# Patient Record
(demographics unavailable — no encounter records)

---

## 2020-10-28 NOTE — XMS
Summarization Of Episode

                           Created on:2020



Patient:ELVER CHENG

Sex:Male

:1977

External Reference #:[id number]





Demographics







                          Address                   [address] 



                                                    [place] [address]

 

                          Home Phone                [phone]

 

                          Email Address             N

 

                          Preferred Language        English

 

                          Marital Status            Not  or 

 

                          Yazidism Affiliation     NO

 

                          Race                      WH

 

                          Ethnic Group              Not  or 









Author







                          Organization              [facility]









Support







                Name            Relationship    Address         Phone

 

                UE, UNEMPLOYED  Unavailable     Unavailable     Unavailable

 

                UE              Unavailable     Unavailable     Unavailable

 

                LALO, PI      FRIEND          N/A             [phone] [place], [place] [id number] 









Re-disclosure Warning

The records that you are about to access may contain information from federally-
assisted alcohol or drug abuse programs. If such information is present, then 
the following federally mandated warning applies: This information has been 
disclosed to you from records protected by federal confidentiality rules (42 CFR
part 2). The federal rules prohibit you from making any further disclosure of 
this information unless further disclosure is expressly permitted by the written
consent of the person to whom it pertains or as otherwise permitted by 42 CFR 
part 2. A general authorization for the release of medical or other information 
is NOT sufficient for this purpose. The Federal rules restrict any use of the 
information to criminally investigate or prosecute any alcohol or drug abuse 
patient.The records that you are about to access may contain highly sensitive 
health information, the redisclosure of which is protected by Article 27-F of 
the [facility] Public Health law. If you continue you may haveaccess to 
information: Regarding HIV / AIDS; Provided by facilities licensed or operated 
by the [facility] Office of Mental Health; or Provided by the [facility]
Office for People With Developmental Disabilities. If such information is 
present, then the following [place] mandated warning applies: This 
information has been disclosed to you from confidential records which are 
protected by state law. State law prohibits you from making any further 
disclosure of this information without the specific written consent of the 
person to whom it pertains, or as otherwise permitted by law. Any unauthorized 
further disclosure in violation of state law may result in a fine or half-way 
sentence or both. A general authorization for the release of medical or other 
information is NOT sufficient authorization for further disclosure.



Insurance Providers







          Payer name Policy type Policy ID Covered   Covered party's Policy    P

jose



                    / Coverage           party ID  relationship to Lakhani    Inf

ormation



                    type                          lakhani              

 

          [employer]           [id number]            SP                  [id number]



          [employer]                                                         

 

          [employer]           [id number]            SP                  [id number]



          [employer]                                                   

 

          [employer]ICA[employer]            [id number]            SP                  [id number]

## 2020-10-28 NOTE — PDOC
Rapid Medical Evaluation


Time Seen by Provider: 10/28/20 14:55


Medical Evaluation: 


                                    Allergies











Allergy/AdvReac Type Severity Reaction Status Date / Time


 


Penicillins AdvReac  Hives Verified 10/23/19 19:04











10/28/20 15:00


CC: abscess to dorsal aspect of left foot, ivda heroin and site used for 

injection of heroin, denies FB to area


Exam: + fluctulance, surrounding skin with erythema


Plan: labs, u/s, iv clinda





**Discharge Disposition





- Diagnosis


 Foot abscess








- Referrals





- Patient Instructions





- Post Discharge Activity

## 2020-10-28 NOTE — HP
CHIEF COMPLAINT: LE Abscess





PCP: None





HISTORY OF PRESENT ILLNESS:





43 y.o. F PMHx of HIV use in the process of transitioning M to F w/ breast 

augmentation in 2019 presenting from [facility] due to multiple abscess on the 

b/l LE. Patient states he has been injecting heroin in his legs and this last 

batch caused him to break out in multiple abscess one on the L calf, L foot and 

R foot. Patient had been admitted at [facility] back in February due 

to abscess w/ MRSA infection requiring IV antibiotics through a PICC line. He 

states he tried to drain 2 of the abscess several days ago and they are now open

lesions that are very painful. He reports fever, chills and body aches but 

denies chest pain, headache, sob, N/V/D. Family Hx is significant for his 

Fathers death at 49 due to a heart condition and mother  at 53 due to 

diabetes. 





ER course was notable for:


(1) EKG


(2) LE US


(3) CBC & CMP





Recent Travel: No





PAST MEDICAL HISTORY: Heroin use





PAST SURGICAL HISTORY: L arm reconstruction w/ hardware placement in , 

Breast augmentation in 2019





Social History:


Smoking:No


Alcohol: No


Drugs: Heroin 10-15 bags a day, Last use 2 days ago





Allergies





Penicillins Adverse Reaction (Verified 10/28/20 15:06)


   Hives








HOME MEDICATIONS:


                                Home Medications











 Medication  Instructions  Recorded


 


Emtricitabine/Tenofovir [Truvada -] 1 tablet PO DAILY 10/23/19


 


Estradiol Valerate [Delestrogen] 20 mg IM WEEKLY 10/23/19


 


Spironolactone 100 mg PO BID 10/23/19


 


Divalproex [Depakote -] 500 mg PO BID 10/28/20








REVIEW OF SYSTEMS


CONSTITUTIONAL: fever, chills


Absent: diaphoresis, generalized weakness, malaise, loss of appetite, weight 

change


HEENT: 


Absent:  rhinorrhea, nasal congestion, throat pain, throat swelling, difficulty 

swallowing, mouth swelling, ear pain, eye pain, visual changes


CARDIOVASCULAR: 


Absent: chest pain, syncope, palpitations, irregular heart rate, 

lightheadedness, peripheral edema


RESPIRATORY: 


Absent: cough, shortness of breath, dyspnea with exertion, orthopnea, wheezing, 

stridor, hemoptysis


GASTROINTESTINAL:


Absent: abdominal pain, abdominal distension, nausea, vomiting, diarrhea, 

constipation, melena, hematochezia


GENITOURINARY: 


Absent: dysuria, frequency, urgency, hesitancy, hematuria, flank pain, genital 

pain


MUSCULOSKELETAL: arthralgia


Absent: myalgia, joint swelling, back pain, neck pain


SKIN: multiple abscess( 1 R foot, 1 L calf, 1 L foot) warm, erythematous and 

painful.


Absent: rash, itching, pallor


HEMATOLOGIC/IMMUNOLOGIC: 


Absent: easy bleeding, easy bruising, lymphadenopathy, frequent infections


ENDOCRINE:


Absent: unexplained weight gain, unexplained weight loss, heat intolerance, cold

intolerance


NEUROLOGIC: 


Absent: headache, focal weakness or paresthesias, dizziness, unsteady gait, 

seizure, mental status changes, bladder or bowel incontinence


PSYCHIATRIC: 


Absent: anxiety, depression, suicidal or homicidal ideation, hallucinations.








PHYSICAL EXAMINATION


                               Vital Signs - 24 hr











  10/28/20





  15:03


 


Pulse Rate 61


 


Respiratory 18





Rate 


 


Blood Pressure 113/73


 


O2 Sat by Pulse 97





Oximetry (%) 











GENERAL: Awake, alert, and fully oriented, in no acute distress.


HEAD: Normal with no signs of trauma.


EYES: Pupils equal, round and reactive to light, extraocular movements intact, 

sclera anicteric, conjunctiva clear.


EARS, NOSE, THROAT: Oropharynx clear without exudates. Moist mucous membranes.


NECK: No JVD, or masses.


LUNGS: Breath sounds equal, clear to auscultation bilaterally. No wheezes, and 

no crackles. No accessory muscle use.


HEART: Regular rate and rhythm, normal S1 and S2 without murmur, rub or gallop.


ABDOMEN: Soft, nontender, not distended, normoactive bowel sounds, no guarding, 

no rebound, no masses.


MUSCULOSKELETAL: Normal range of motion at all joints. No bony deformities or 

tenderness. No CVA tenderness.


UPPER EXTREMITIES: 2+ pulses, warm, well-perfused. No cyanosis. No clubbing. No 

peripheral edema.


LOWER EXTREMITIES: 2+ pulses, warm, well-perfused. No calf tenderness. No 

peripheral edema. 


NEUROLOGICAL:  Cranial nerves II-XII intact. Normal speech.


PSYCHIATRIC: Cooperative. Good eye contact. Appropriate mood and affect.


SKIN: Warm, dry, normal turgor. R foot medial aspect has a 2cm x 1cm abscess 

that has been drained, warm to touch, erythema surrounding. L calf has an 

abscess 1cm x 1cm w/ an open lesion 0.5cm in diameter the area is indurated w/ 

surrounding erythema. L dorsal aspect of his foot has an abscess 2cm x 3cm 

fluctuance w/ surrounding erythema, warm and very painful to touch.





                         Laboratory Results - last 24 hr











  10/28/20 10/28/20 10/28/20





  17:00 17:00 17:00


 


WBC  20.1 H  


 


RBC  4.48  


 


Hgb  13.6  


 


Hct  39.5  


 


MCV  88.0  


 


MCH  30.2  


 


MCHC  34.3  


 


RDW  14.6  


 


Plt Count  272  D  


 


MPV  8.3  D  


 


Absolute Neuts (auto)  15.1 H  


 


Total Counted  100  


 


Neutrophils %  75.4  D  


 


Neutrophils % (Manual)  66.0  


 


Band Neutrophils %  4.0  


 


Lymphocytes %  15.6  D  


 


Lymphocytes % (Manual)  21.0  


 


Monocytes %  8.8  


 


Monocytes % (Manual)  8  


 


Eosinophils %  0.0  D  


 


Eosinophils % (Manual)  0.0  


 


Basophils %  0.2  


 


Basophils % (Manual)  1.0  


 


Nucleated RBC %  0  


 


Platelet Estimate  Normal  


 


Platelet Comment  No clumping noted  


 


Polychromasia  1+  


 


Poikilocytosis  1+  


 


Lux Cells  1+  


 


PT with INR   


 


INR   


 


Sodium   129 L 


 


Potassium   3.9 


 


Chloride   96 L 


 


Carbon Dioxide   25 


 


Anion Gap   9 


 


BUN   13.9 


 


Creatinine   1.0 


 


Est GFR (CKD-EPI)AfAm   106.36 


 


Est GFR (CKD-EPI)NonAf   91.77 


 


Random Glucose   98 


 


Lactic Acid    1.0


 


Calcium   9.0 


 


Total Bilirubin   0.5 


 


AST   24 


 


ALT   29 


 


Alkaline Phosphatase   70 


 


Total Protein   8.0 


 


Albumin   3.0 L 














  10/28/20





  17:00


 


WBC 


 


RBC 


 


Hgb 


 


Hct 


 


MCV 


 


MCH 


 


MCHC 


 


RDW 


 


Plt Count 


 


MPV 


 


Absolute Neuts (auto) 


 


Total Counted 


 


Neutrophils % 


 


Neutrophils % (Manual) 


 


Band Neutrophils % 


 


Lymphocytes % 


 


Lymphocytes % (Manual) 


 


Monocytes % 


 


Monocytes % (Manual) 


 


Eosinophils % 


 


Eosinophils % (Manual) 


 


Basophils % 


 


Basophils % (Manual) 


 


Nucleated RBC % 


 


Platelet Estimate 


 


Platelet Comment 


 


Polychromasia 


 


Poikilocytosis 


 


Lux Cells 


 


PT with INR  14.30 H


 


INR  1.17 H


 


Sodium 


 


Potassium 


 


Chloride 


 


Carbon Dioxide 


 


Anion Gap 


 


BUN 


 


Creatinine 


 


Est GFR (CKD-EPI)AfAm 


 


Est GFR (CKD-EPI)NonAf 


 


Random Glucose 


 


Lactic Acid 


 


Calcium 


 


Total Bilirubin 


 


AST 


 


ALT 


 


Alkaline Phosphatase 


 


Total Protein 


 


Albumin 











ASSESSMENT/PLAN:


43 y.o. F PMHx of HIV use in the process of transitioning M to F w/ breast 

augmentation in 2019 presenting from [facility] due to multiple abscess on the 

b/l LE.





# Cellulitis w/ abscess


- WBC 20.1 likely secondary to IVDU


- Vancomycin 1.5g BID


- Aztreonam 2g BID


- b/l LE MRI r/o osteo and quantify abscess size


- Consult wound care: Dr. Hope


- Consult ID: Dr. Burleson


- Consult Surgery: Boom


- EKG NSR, rate 66, QTc 448ms





# Hyponatremia


- Na 129, secondary to depakote & estrogen replacement therapy


- UA


- Urine osmolarity


- Urine sodium


- Lipids


- Hold IVF, reduce free water intake





# Heroin withdrawal


- Clonidine 0.1mg BID


- Addiction medicine: Dr. De La Cruz


- HIV test


- Thiamine, Folic acid


- Fall precautions





# r/o covid


- Covid PCR ordered


- Isolation precautions: Pending test result





# FEN


- Fluids: Hold IVF reduce water intake


- Diet: NPO at midnight


- Cont. to monitor and replete as appropriate





# DVT ppx


- Lovenox 40 sq Daily





# Disposition


- Admit to med-surg








Family Medical History


Family History: As Documented





Visit type





- Emergency Visit


Emergency Visit: Yes


ED Registration Date: 10/28/20


Care time: The patient presented to the Emergency Department on the above date 

and was hospitalized for further evaluation of their emergent condition.





- New Patient


This patient is new to me today: Yes


Date on this admission: 10/29/20





- Critical Care


Critical Care patient: No





ATTENDING PHYSICIAN STATEMENT





I saw and evaluated the patient.


I reviewed the resident's note and discussed the case with the resident.


I agree with the resident's findings and plan as documented.








SUBJECTIVE:








OBJECTIVE:








ASSESSMENT AND PLAN:

## 2020-10-28 NOTE — XMS
Summarization Of Episode

                           Created on:2020



Patient:ELVER CHENG

Sex:Male

:1977

External Reference #:[id number]





Demographics







                          Address                   [address] 



                                                    [place] [address]

 

                          Home Phone                [phone]

 

                          Email Address             N

 

                          Preferred Language        English

 

                          Marital Status            Not  or 

 

                          Holiness Affiliation     NO

 

                          Race                      WH

 

                          Ethnic Group              Not  or 









Author







                          Organization              [facility]









Support







                Name            Relationship    Address         Phone

 

                UE              Unavailable     Unavailable     Unavailable

 

                LALO          FRIEND          N/A             [phone] [place], [place] [id number] 









Re-disclosure Warning

The records that you are about to access may contain information from federally-
assisted alcohol or drug abuse programs. If such information is present, then 
the following federally mandated warning applies: This information has been 
disclosed to you from records protected by federal confidentiality rules (42 CFR
part 2). The federal rules prohibit you from making any further disclosure of 
this information unless further disclosure is expressly permitted by the written
consent of the person to whom it pertains or as otherwise permitted by 42 CFR 
part 2. A general authorization for the release of medical or other information 
is NOT sufficient for this purpose. The Federal rules restrict any use of the 
information to criminally investigate or prosecute any alcohol or drug abuse 
patient.The records that you are about to access may contain highly sensitive 
health information, the redisclosure of which is protected by Article 27-F of 
the [facility] Public Health law. If you continue you may haveaccess to 
information: Regarding HIV / AIDS; Provided by facilities licensed or operated 
by the [facility] Office of Mental Health; or Provided by the [facility]
Office for People With Developmental Disabilities. If such information is 
present, then the following [place] mandated warning applies: This 
information has been disclosed to you from confidential records which are 
protected by state law. State law prohibits you from making any further 
disclosure of this information without the specific written consent of the 
person to whom it pertains, or as otherwise permitted by law. Any unauthorized 
further disclosure in violation of state law may result in a fine or long term 
sentence or both. A general authorization for the release of medical or other 
information is NOT sufficient authorization for further disclosure.



Insurance Providers







          Payer name Policy type Policy ID Covered   Covered party's Policy    P

jose



                    / Coverage           party ID  relationship to Lakhani    Inf

ormation



                    type                          lakhani              

 

          [facility]           [id number]            SP                  [id number]



          [employer]                                                   

 

          [employer]           [id number]            SP                  [id number]



          [employer]                                                         

 

          [employer]            [id number]            SP                  [id number]

## 2020-10-28 NOTE — PN
Teaching Attending Note


Name of Resident: Shekhar Loco





ATTENDING PHYSICIAN STATEMENT





I saw and evaluated the patient.


I reviewed the resident's note and discussed the case with the resident.


I agree with the resident's findings and plan as documented.








SUBJECTIVE:


Patient is a 43 year old woman with a PMH of Penicillin allergy, Heroin use (

IVDA&Skin popping), PTSD, Transgender without gender reassignment, Klinefelter 

syndrome, Depression, Hepatitis C disease, Pilonidal cyst and ?NIDDM who was 

sent from [facility] to the ER today with multiple abscesses to her feet and 

legs. She states she usually shoots up in the legs and feet and wants to detox 

from heroin. She states that the areas are very painful, denies fevers, chills, 

nausea, vomiting, shortness of breath or difficulty breathing. Patient denies 

chest pain, abdominal pain, headache, palpitations, dizziness, diarrhea, 

constipation, dysuria, frequency, urgency, melena, hematochezia or hematuria. No

sick contacts or recent travels. Family history - father  of heart disease 

at age 49 and mother  of complications of DM at age 53.  





OBJECTIVE:


Alert


                                   Vital Signs











 Period  Temp  Pulse  Resp  BP Sys/Egan  Pulse Ox


 


 Last 24 Hr    61  18  113/73  97








HEENT: No Jaundice, eye redness or discharge, PERRLA, EOMI. Normocephalic, 

atraumatic. External ears are normal and hearing is grossly intact. No nasal 

discharge.


Neck: Supple, nontender. No palpable adenopathy or thyromegaly. No JVD


Chest: Good effort. Clear to auscultation and percussion.


Heart: Regular. No S3, rub or murmur


Abdomen: Not distended, soft, nontender and no HSM. No rebound or guarding. 

Normal bowel sounds.


Ext: Peripheral pulses intact. No leg edema. Left foot abscess with surrounding 

erythema. Right lateral malleolus with weeping abscess and surrounding 

cellulitis. Left posterior knee abscess. 


Skin: Warm and dry. No petechiae, rash or ecchymosis. Sequelae of IVDA.


Neuro: Alert. Oriented x3. CN 2-12 grossly intact. Sensation grossly intact in 

all four extremities and DTR are symmetric.


Psych: Appropriate mood and affect. Good insight.





                                Home Medications











 Medication  Instructions  Recorded


 


Emtricitabine/Tenofovir [Truvada -] 1 tablet PO DAILY 10/23/19


 


Estradiol Valerate [Delestrogen] 20 mg IM WEEKLY 10/23/19


 


Spironolactone 100 mg PO BID 10/23/19


 


Divalproex [Depakote -] 500 mg PO BID 10/28/20








                              Abnormal Lab Results











  10/28/20 10/28/20 10/28/20





  17:00 17:00 17:00


 


WBC  20.1 H  


 


Absolute Neuts (auto)  15.1 H  


 


PT with INR    14.30 H


 


INR    1.17 H


 


Sodium   129 L 


 


Chloride   96 L 


 


Albumin   3.0 L 








                               Current Medications











Generic Name Dose Route Start Last Admin





  Trade Name Freq  PRN Reason Stop Dose Admin


 


Acetaminophen  650 mg  10/28/20 22:17 





  Tylenol -  PO  





  Q6H PRN  





  Fever Or Pain 7-10  


 


Clonidine  0.1 mg  10/29/20 10:00 





  Catapres -  PO  





  BID LUZ  


 


Enoxaparin Sodium  40 mg  10/29/20 10:00 





  Lovenox -  SQ  





  DAILY LUZ  


 


Folic Acid  1 mg  10/29/20 10:00 





  Folic Acid -  PO  





  DAILY LUZ  


 


Vancomycin HCl 1,500 mg/  250 mls @ 125 mls/hr  10/29/20 11:00 





  Dextrose  IVPB  





  Q12H LUZ  





  Protocol  


 


Aztreonam 2 gm/ Dextrose  100 mls @ 100 mls/hr  10/29/20 11:00 





  IVPB  





  Q12H LUZ  





  Protocol  


 


Multivitamins/Minerals/Vitamin C  1 tab  10/29/20 10:00 





  Tab-A-Vit -  PO  





  DAILY LUZ  


 


Thiamine HCl  100 mg  10/29/20 10:00 





  Vitamin B1 -  PO  





  DAILY [facility]  











ASSESSMENT AND PLAN:


1. Lower extremity cellulitis and abscesses  - No acute abnormality on CXR. Left

foot sonogram shows abscess on the dorsum of the foot. Sepsis workup done. Being

treated with IV Aztreonam and IV Vancomycin and IV NS. Will get MRI of lower 

extremities to rule out osteomyelitis and quantify abscesses. Consult Surgery, 

Podiatry, Wound care and ID. EKG shows NSR at 66/minute and QTc 448, T wave 

inversion in V1-V3 with no significant acute ischemic ST changes. No old EKG 

available for comparison. Initial troponin is negative. Will avoid drugs that 

may prolong QTc. 





Hyponatremia likely partly due to estrogen injections. Will get urinalysis, 

urine osmolality, lipid profile and urine sodium. Limit free water intake. Viral

testing for COVID-19 ordered and patient placed on airborne, droplet and contact

isolation. Will continue comprehensive care for all of patients comorbid 

conditions.





2. Hypoalbuminemia - Possibly due to combined effects of malnutrition and 

inflammation associated with comorbid conditions. Will ensure adequate dietary 

protein intake and also consult dietician. Urinalysis pending.





3. Polysubstance abuse  Will monitor closely for drug withdrawal. Implement 

CIWA Librium alcohol withdrawal protocol and do neurochecks. Implement seizure, 

fall and aspiration precautions. Treat with thiamine and folic acid. Monitor and

replete electrolytes (Ca,Mg,K,P). Counseled patient about abstaining from 

illicit drugs/alcohol. Will consult Addiction specialist and refer to 

drug/alcohol detox upon discharge.





4. DVT prophylaxis - Lovenox 40 mg SQ q 24 hours.     





5. Advance directives - Full code

## 2020-10-28 NOTE — PDOC
History of Present Illness





- General


Chief Complaint: Wound


Stated Complaint: WOUND CARE


Time Seen by Provider: 10/28/20 14:55


History Source: Patient


Exam Limitations: No Limitations





Past History





- Travel History


Traveled outside of the country in the last 30 days: No


Close contact w/someone who was outside of country & ill: No





- Medical History


Allergies/Adverse Reactions: 


                                    Allergies











Allergy/AdvReac Type Severity Reaction Status Date / Time


 


Penicillins AdvReac  Hives Verified 10/28/20 15:06











Home Medications: 


Ambulatory Orders





Emtricitabine/Tenofovir [Truvada -] 1 tablet PO DAILY 10/23/19 


Estradiol Valerate [Delestrogen] 20 mg IM WEEKLY 10/23/19 


Spironolactone 100 mg PO BID 10/23/19 


Divalproex [Depakote -] 500 mg PO BID 10/28/20 








Asthma: No


Cardiac Disorders: No


COPD: No


Diabetes: Yes


GI Disorders: No


 Disorders: No


HTN: No


Kidney Stones: No


Liver Disease: Yes (HEP C)


Seizures: No





- Surgical History


Abdominal Surgery: No


Appendectomy: No


Cardiac Surgery: No


Cholecystectomy: No


Lung Surgery: No


Neurologic Surgery: No


Orthopedic Surgery: No





- Reproductive History


Testicular Surgery: No (still not reassigned surgery done.)





- Psycho-Social/Smoking History


Smoking History: Never smoked


Have you smoked in the past 12 months: Yes


Number of Cigarettes Smoked Daily: 10


'Breaking Loose' booklet given: 10/23/19





- Substance Abuse Hx (Audit-C & DAST Scrn)


In the last yr the pt used illegal drug/Rx for NonMed reason: Yes


Score:  Yes response is considered Positive: 1


Screen Result (Positive result requires Nsg. DAST-10): Positive





**Review of Systems





- Review of Systems


Able to Perform ROS?: Yes


Comments:: 





10/28/20 16:52


CONSTITUTIONAL: 


Absent: fever, chills, diaphoresis, generalized weakness, malaise, loss of 

appetite


HEENT: 


Absent: rhinorrhea, nasal congestion, throat pain, throat swelling, difficulty 

swallowing, mouth swelling, ear pain, eye pain, visual Changes


CARDIOVASCULAR: 


Absent: chest pain, loss of consciousness, palpitations, irregular heart rate, 

peripheral edema


RESPIRATORY: 


Absent: cough, shortness of breath, dyspnea with exertion, orthopnea, wheezing, 

stridor, hemoptysis


GASTROINTESTINAL:


Absent: abdominal pain, abdominal distension, nausea, vomiting, diarrhea, 

constipation, melena, hematochezia


GENITOURINARY: 


Absent: dysuria, frequency, urgency, hesitancy, hematuria, flank pain, genital 

pain


MUSCULOSKELETAL: 


Absent: myalgia, arthralgia, joint swelling


SKIN: 


Present: foot wounds and cellulitis Absent: rash, itching, pallor


HEMATOLOGIC/IMMUNOLOGIC: 


Absent: easy bleeding, easy bruising, lymphadenopathy, frequent infections


ENDOCRINE:


Absent: unexplained weight gain, unexplained weight loss, heat intolerance, cold

intolerance


NEUROLOGIC: 


Absent: headache, focal weakness or paresthesias, dizziness, unsteady gait, 

seizure, mental status changes, bladder or bowel incontinence


PSYCHIATRIC: 


Absent: anxiety, depression, suicidal or homicidal ideation, hallucinations.


Is the patient limited English proficient: No





*Physical Exam





- Vital Signs


                                Last Vital Signs











Temp Pulse Resp BP Pulse Ox


 


    61   18   113/73   97 


 


    10/28/20 15:03  10/28/20 15:03  10/28/20 15:03  10/28/20 15:03














- Physical Exam





10/28/20 17:03


GENERAL:


Well developed, well nourished. Awake and alert. No acute distress.


HEENT:


Normocephalic, atraumatic. PERRLA, EOMI. No conjunctival pallor. Sclera are non-

icteric. Moist mucous membranes. 


NECK: 


Supple. Full ROM. No lymphadenopathy.


CARDIOVASCULAR:


Regular rate and rhythm. No murmurs, rubs, or gallops. Distal pulses are 2+ and 

symmetric. 


PULMONARY: 


No evidence of respiratory distress. Lungs clear to auscultation bilaterally. No

wheezing, rales or rhonchi.


ABDOMINAL:


Soft. Non-tender. Non-distended. No rebound or guarding. No organomegaly. 

Normoactive bowel sounds. 


MUSCULOSKELETAL 


Normal range of motion at all joints. No bony deformities or tenderness. No CVA 

tenderness.


EXTREMITIES: 


No cyanosis. No clubbing. No edema. No calf tenderness.


SKIN: 


Patient with multiple abscesses.  Left foot ventral aspect with fluctuance as 

well as surrounding erythema.  Right lateral malleolus with weeping abscess and 

surrounding cellulitis.  Open draining abscess to the left posterior knee. Warm 

and dry. Normal capillary refill. No jaundice. 


NEUROLOGICAL: 


Alert, awake, appropriate. Cranial nerves 2-12 intact. No deficits to light 

touch and temperature in face, upper extremities and lower extremities. No motor

deficits in the in face, upper extremities and lower extremities. Normoreflexic 

in the upper and lower extremities. Normal speech. Toes are down-going 

bilaterally. Gait is normal without ataxia.


PSYCHIATRIC: 


Cooperative. Good eye contact. Appropriate mood and affect.





ED Treatment Course





- LABORATORY


CBC & Chemistry Diagram: 


                                 10/28/20 17:00





                                 10/28/20 17:00





Medical Decision Making





- Medical Decision Making





10/28/20 17:07


The patient is a 43-year-old male past medical history of IV drug use, heroin l

ast used 2 days ago, presents to the ER today with multiple abscesses to his 

feet and legs.  He states he usually shoots up in his legs and feet.  He states 

that he wants to detox from heroin.  He states that the areas are very painful 

denies fevers, chills, nausea, vomiting, shortness of breath or difficulty 

breathing.





A/P: Cellulitis and IV drug user


On exam patient has multiple abscesses see exam findings.


Basic labs, ultrasound ordered from [facility].


Additional blood cultures, lab work also ordered as this will most likely be an 

admission.


Signout given to TAQUERIA Palma pending lab work and ultrasound findings.





Discharge





- Discharge Information


Problems reviewed: Yes


Clinical Impression/Diagnosis: 


 Foot abscess





Cellulitis


Qualifiers:


 Site of cellulitis: extremity Site of cellulitis of extremity: lower extremity 

Laterality: unspecified laterality Qualified Code(s): L03.119 - Cellulitis of 

unspecified part of limb





Condition: Fair





- Follow up/Referral





- Patient Discharge Instructions





- Post Discharge Activity

## 2020-10-28 NOTE — PDOC
*Physical Exam





- Vital Signs


                                Last Vital Signs











Temp Pulse Resp BP Pulse Ox


 


    61   18   113/73   97 


 


    10/28/20 15:03  10/28/20 15:03  10/28/20 15:03  10/28/20 15:03














ED Treatment Course





- LABORATORY


CBC & Chemistry Diagram: 


                                 10/28/20 17:00





                                 10/28/20 17:00





- ADDITIONAL ORDERS


Additional order review: 


                               Laboratory  Results











  10/28/20 10/28/20 10/28/20





  17:00 17:00 17:00


 


PT with INR  14.30 H  


 


INR  1.17 H  


 


Sodium    129 L


 


Potassium    3.9


 


Chloride    96 L


 


Carbon Dioxide    25


 


Anion Gap    9


 


BUN    13.9


 


Creatinine    1.0


 


Est GFR (CKD-EPI)AfAm    106.36


 


Est GFR (CKD-EPI)NonAf    91.77


 


Random Glucose    98


 


Lactic Acid   1.0 


 


Calcium    9.0


 


Total Bilirubin    0.5


 


AST    24


 


ALT    29


 


Alkaline Phosphatase    70


 


Total Protein    8.0


 


Albumin    3.0 L








                                        











  10/28/20





  17:00


 


RBC  4.48


 


MCV  88.0


 


MCHC  34.3


 


RDW  14.6


 


MPV  8.3  D


 


Neutrophils %  75.4  D


 


Lymphocytes %  15.6  D


 


Monocytes %  8.8


 


Eosinophils %  0.0  D


 


Basophils %  0.2














ED Progress Note





- Progress Note


Progress Note: 





10/28/20 18:27


Received patient from OLENA Echeverria.





Briefly this is a 43-year-old man with history of IV drug use who presents 

emergency department with cellulitis and abscesses.  Multiple areas of 

cellulitis present notable to the dorsum of the left foot and cellulitis in 

bilateral popliteal area.





Labs notable for leukocytosis 20,000.


DVT shows abscess the dorsum of the left foot





Clindamycin given





Will admit once all testing is resulted.





Medical Decision Making





- Medical Decision Making





10/28/20 18:36


                                Laboratory Tests











  10/28/20 10/28/20 10/28/20





  17:00 17:00 17:00


 


WBC  20.1 H  


 


RBC  4.48  


 


Hgb  13.6  


 


Hct  39.5  


 


MCV  88.0  


 


MCH  30.2  


 


MCHC  34.3  


 


RDW  14.6  


 


Plt Count  272  D  


 


MPV  8.3  D  


 


Absolute Neuts (auto)  15.1 H  


 


Neutrophils %  75.4  D  


 


Neutrophils % (Manual)  Pending  


 


Lymphocytes %  15.6  D  


 


Monocytes %  8.8  


 


Eosinophils %  0.0  D  


 


Basophils %  0.2  


 


Nucleated RBC %  0  


 


PT with INR   


 


INR   


 


Sodium   129 L 


 


Potassium   3.9 


 


Chloride   96 L 


 


Carbon Dioxide   25 


 


Anion Gap   9 


 


BUN   13.9 


 


Creatinine   1.0 


 


Est GFR (CKD-EPI)AfAm   106.36 


 


Est GFR (CKD-EPI)NonAf   91.77 


 


Random Glucose   98 


 


Lactic Acid    1.0


 


Calcium   9.0 


 


Total Bilirubin   0.5 


 


AST   24 


 


ALT   29 


 


Alkaline Phosphatase   70 


 


Total Protein   8.0 


 


Albumin   3.0 L 














  10/28/20





  17:00


 


WBC 


 


RBC 


 


Hgb 


 


Hct 


 


MCV 


 


MCH 


 


MCHC 


 


RDW 


 


Plt Count 


 


MPV 


 


Absolute Neuts (auto) 


 


Neutrophils % 


 


Neutrophils % (Manual) 


 


Lymphocytes % 


 


Monocytes % 


 


Eosinophils % 


 


Basophils % 


 


Nucleated RBC % 


 


PT with INR  14.30 H


 


INR  1.17 H


 


Sodium 


 


Potassium 


 


Chloride 


 


Carbon Dioxide 


 


Anion Gap 


 


BUN 


 


Creatinine 


 


Est GFR (CKD-EPI)AfAm 


 


Est GFR (CKD-EPI)NonAf 


 


Random Glucose 


 


Lactic Acid 


 


Calcium 


 


Total Bilirubin 


 


AST 


 


ALT 


 


Alkaline Phosphatase 


 


Total Protein 


 


Albumin 








Ultrasound as read by Dr. Landin: Approximate 2.6 x 2.3 x 1.1 cm complex fluid 

collection with intraluminal debris is seen along the dorsum of the left foot 

with associated increased vascularity on Doppler imaging suggestive of an 

abscess.





Chest x-ray as read by me: Angle sharp.  Cardiac silhouette is within normal 

limits.  No focal infiltrates or consolidations are noted.





Covid pending





Will contact hospitalist for admission.


10/28/20 18:50


Case has been discussed with Dr. Patten who accepts patient for inpatient 

admission.  Antibiotics changed to aztreonam and vancomycin given patient's 

penicillin allergy.  Clindamycin stopped.





Discharge





- Discharge Information


Problems reviewed: Yes


Clinical Impression/Diagnosis: 


 Foot abscess, Cellulitis





Condition: Fair





- Admission


Yes





- Follow up/Referral





- Patient Discharge Instructions





- Post Discharge Activity

## 2020-10-29 NOTE — CON.ID
Consult


Consult Specialty:: infectious diseases


Referred by:: Hospitalist


Reason for Consultation:: abscess on the foot due to injecting cocaine





- History of Present Illness


Chief Complaint: abscess on b/l feet


History of Present Illness: 





43 y.o. F PMHx of hcv  w/ breast augmentation in 2019 presenting from [facility] 

due to multiple abscess on the b/l LE. Patient states he has been injecting 

heroin in his legs and this last batch caused her to break out in multiple 

abscess one on the L calf, L foot and R foot. Patient had been admitted at 

[facility] back in February due to abscess w/ MRSA infection 

requiring IV antibiotics through a PICC line. she states he tried to drain 2 of 

the abscess several days ago and they are now open lesions that are very 

painful. she reports fever, chills and body aches but denies chest pain, 

headache, sob, N/V/D. Family Hx is significant for his Fathers death at 49 due 

to a heart condition and mother  at 53 due to diabetes. 


patient mentions that the abscess on the rt leg ahs  drained








- History Source


History Provided By: Patient


Limitations to Obtaining History: No Limitations





- Alcohol/Substance Use


Hx Alcohol Use: Yes





- Smoking History


Smoking history: Never smoked


Have you smoked in the past 12 months: Yes


Aproximately how many cigarettes per day: 10





Home Medications





- Allergies


Allergies/Adverse Reactions: 


                                    Allergies











Allergy/AdvReac Type Severity Reaction Status Date / Time


 


Penicillins AdvReac  Hives Verified 10/28/20 15:06














- Home Medications


Home Medications: 


Ambulatory Orders





Emtricitabine/Tenofovir [Truvada -] 1 tablet PO DAILY 10/23/19 


Estradiol Valerate [Delestrogen] 20 mg IM WEEKLY 10/23/19 


RX: Spironolactone 100 mg PO BID 10/23/19 


Divalproex [Depakote -] 500 mg PO BID 10/28/20 











Review of Systems





- Review of Systems


Constitutional: reports: No Symptoms


Eyes: reports: No Symptoms


HENT: reports: No Symptoms


Neck: reports: No Symptoms


Cardiovascular: reports: No Symptoms


Respiratory: reports: No Symptoms


Gastrointestinal: reports: No Symptoms


Integumentary: reports: Wound, Other (abscess on the foot)


Neurological: reports: No Symptoms


Hematology/Lymphatic: reports: No Symptoms


Psychiatric: reports: No Symptoms





Physical Exam


Vital Signs: 


                                   Vital Signs











Temperature  98.5 F   10/29/20 06:00


 


Pulse Rate  56 L  10/29/20 06:00


 


Respiratory Rate  20   10/29/20 06:00


 


Blood Pressure  119/69   10/29/20 06:00


 


O2 Sat by Pulse Oximetry (%)  98   10/29/20 06:00











Constitutional: Yes: Well Nourished, Calm


Cardiovascular: Yes: Regular Rate and Rhythm


Respiratory: Yes: Regular, CTA Bilaterally


Gastrointestinal: Yes: Normal Bowel Sounds, Soft


Musculoskeletal: Yes: WNL


Extremities: Yes: Other


Wound/Incision: Yes: Other (multiple abscess)


Neurological: Yes: Alert, Oriented


Psychiatric: Yes: Alert, Oriented


Labs: 


                                    CBC, BMP





                                 10/28/20 17:00 





                                 10/28/20 17:00 











Imaging





- Results


Chest X-ray: Report Reviewed, Image Reviewed


Ultrasound: Report Reviewed, Image Reviewed





Assessment/Plan





this patient who is a  drug abuser injecting at multiple places--cocaine 

--injected in the legs coming with abscess--surgery ahs  seen the patient and 

plan is for drainage


will start him on broad  spectrum abx


surgery


await for cx report

## 2020-10-29 NOTE — PN
Physical Exam: 


SUBJECTIVE: Patient seen and examined.  





Patient goes by the name Geraldine.  She tells me that she is transgender: male to

female and is seeking gender transformation at [place] next year.  She 

injects heroin and often uses her foot and developed an abscess of her anterior 

left foot.  Prior to this admission, she developed an abscess on her left calf 

and right ankle and lanced it herself with a syringe.  Admits to using heroin 

since age of 21. 





She wants to stop using heroin and now on a methadone taper with [facility]. 





OBJECTIVE:


years of injecting heroin  use-since age 21, will send for an echo


blood cultures pending and ID following


----


Patient is a 43 year old transgender female with history of heroin use since the

age of 21. Patient is a male by birth but identifies as a female and will be 

addressed as a female  as per her wishes.  She presents from [facility] with 

multiple abscess on bilateral lower ext.  She tells me she self lanced the one 

of her right ankle and left calf with a syringe.  She now has a large painful 

abscess on the anterior portion of the left foot and will be going to the OR 

today.  Discussed with her the dangers of lancing her own wounds as it can cause

a worse infection such as a blood infection or osteomyelitis.











 Period  Temp  Pulse  Resp  BP Sys/Egan  Pulse Ox


 


 Last 24 Hr  98.2 F-98.5 F  56-64  18-20  113-119/68-73  97-98








GENERAL: The patient is awake, alert, and fully oriented, in no acute distress.


HEAD: Normal with no signs of trauma.  multiple facial tattoos


EYES: PERRL, extraocular movements intact, sclera anicteric, conjunctiva clear. 

No ptosis. 


ENT: Ears normal, nares patent, oropharynx clear without exudates, moist mucous 

membranes.


NECK: Trachea midline, full range of motion, supple. 


LUNGS: Breath sounds equal, clear to auscultation bilaterally, no wheezes 


HEART: Regular rate and rhythm 


ABDOMEN: Soft, nontender, nondistended, normoactive bowel  


EXTREMITIES: right medial wound, left calf wound, left anterior foot with large 

abscess


NEUROLOGICAL: normal speech, gait not observed.


PSYCH: Normal mood, normal affect.


Laboratory Results - last 24 hr











  10/28/20 10/28/20 10/28/20





  06:10 17:00 17:00


 


WBC   20.1 H 


 


RBC   4.48 


 


Hgb   13.6 


 


Hct   39.5 


 


MCV   88.0 


 


MCH   30.2 


 


MCHC   34.3 


 


RDW   14.6 


 


Plt Count   272  D 


 


MPV   8.3  D 


 


Absolute Neuts (auto)   15.1 H 


 


Total Counted   100 


 


Neutrophils %   75.4  D 


 


Neutrophils % (Manual)   66.0 


 


Band Neutrophils %   4.0 


 


Lymphocytes %   15.6  D 


 


Lymphocytes % (Manual)   21.0 


 


Monocytes %   8.8 


 


Monocytes % (Manual)   8 


 


Eosinophils %   0.0  D 


 


Eosinophils % (Manual)   0.0 


 


Basophils %   0.2 


 


Basophils % (Manual)   1.0 


 


Nucleated RBC %   0 


 


Platelet Estimate   Normal 


 


Platelet Comment   No clumping noted 


 


Polychromasia   1+ 


 


Poikilocytosis   1+ 


 


Lux Cells   1+ 


 


PT with INR   


 


INR   


 


Sodium    129 L


 


Potassium    3.9


 


Chloride    96 L


 


Carbon Dioxide    25


 


Anion Gap    9


 


BUN    13.9


 


Creatinine    1.0


 


Est GFR (CKD-EPI)AfAm    106.36


 


Est GFR (CKD-EPI)NonAf    91.77


 


Random Glucose    98


 


Lactic Acid   


 


Calcium    9.0


 


Total Bilirubin    0.5


 


AST    24


 


ALT    29


 


Alkaline Phosphatase    70


 


Total Protein    8.0


 


Albumin    3.0 L


 


Urine Color  Yellow  


 


Urine Appearance  Clear  


 


Urine pH  7.0  D  


 


Ur Specific Gravity  1.009 L  


 


Urine Protein  Negative  


 


Urine Glucose (UA)  Negative  


 


Urine Ketones  Negative  


 


Urine Blood  Negative  


 


Urine Nitrite  Negative  


 


Urine Bilirubin  Negative  


 


Urine Urobilinogen  2.0  


 


Ur Leukocyte Esterase  Negative  


 


Urine Osmolality   


 


Ur Random Sodium   














  10/28/20 10/28/20 10/29/20





  17:00 17:00 06:10


 


WBC   


 


RBC   


 


Hgb   


 


Hct   


 


MCV   


 


MCH   


 


MCHC   


 


RDW   


 


Plt Count   


 


MPV   


 


Absolute Neuts (auto)   


 


Total Counted   


 


Neutrophils %   


 


Neutrophils % (Manual)   


 


Band Neutrophils %   


 


Lymphocytes %   


 


Lymphocytes % (Manual)   


 


Monocytes %   


 


Monocytes % (Manual)   


 


Eosinophils %   


 


Eosinophils % (Manual)   


 


Basophils %   


 


Basophils % (Manual)   


 


Nucleated RBC %   


 


Platelet Estimate   


 


Platelet Comment   


 


Polychromasia   


 


Poikilocytosis   


 


Lux Cells   


 


PT with INR   14.30 H 


 


INR   1.17 H 


 


Sodium   


 


Potassium   


 


Chloride   


 


Carbon Dioxide   


 


Anion Gap   


 


BUN   


 


Creatinine   


 


Est GFR (CKD-EPI)AfAm   


 


Est GFR (CKD-EPI)NonAf   


 


Random Glucose   


 


Lactic Acid  1.0  


 


Calcium   


 


Total Bilirubin   


 


AST   


 


ALT   


 


Alkaline Phosphatase   


 


Total Protein   


 


Albumin   


 


Urine Color   


 


Urine Appearance   


 


Urine pH   


 


Ur Specific Gravity   


 


Urine Protein   


 


Urine Glucose (UA)   


 


Urine Ketones   


 


Urine Blood   


 


Urine Nitrite   


 


Urine Bilirubin   


 


Urine Urobilinogen   


 


Ur Leukocyte Esterase   


 


Urine Osmolality   


 


Ur Random Sodium    < 18 L














  10/29/20





  06:10


 


WBC 


 


RBC 


 


Hgb 


 


Hct 


 


MCV 


 


MCH 


 


MCHC 


 


RDW 


 


Plt Count 


 


MPV 


 


Absolute Neuts (auto) 


 


Total Counted 


 


Neutrophils % 


 


Neutrophils % (Manual) 


 


Band Neutrophils % 


 


Lymphocytes % 


 


Lymphocytes % (Manual) 


 


Monocytes % 


 


Monocytes % (Manual) 


 


Eosinophils % 


 


Eosinophils % (Manual) 


 


Basophils % 


 


Basophils % (Manual) 


 


Nucleated RBC % 


 


Platelet Estimate 


 


Platelet Comment 


 


Polychromasia 


 


Poikilocytosis 


 


[place] Cells 


 


PT with INR 


 


INR 


 


Sodium 


 


Potassium 


 


Chloride 


 


Carbon Dioxide 


 


Anion Gap 


 


BUN 


 


Creatinine 


 


Est GFR (CKD-EPI)AfAm 


 


Est GFR (CKD-EPI)NonAf 


 


Random Glucose 


 


Lactic Acid 


 


Calcium 


 


Total Bilirubin 


 


AST 


 


ALT 


 


Alkaline Phosphatase 


 


Total Protein 


 


Albumin 


 


Urine Color 


 


Urine Appearance 


 


Urine pH 


 


Ur Specific Gravity 


 


Urine Protein 


 


Urine Glucose (UA) 


 


Urine Ketones 


 


Urine Blood 


 


Urine Nitrite 


 


Urine Bilirubin 


 


Urine Urobilinogen 


 


Ur Leukocyte Esterase 


 


Urine Osmolality  251 L


 


Ur Random Sodium 








Active Medications











Generic Name Dose Route Start Last Admin





  Trade Name Freq  PRN Reason Stop Dose Admin


 


Acetaminophen  650 mg  10/28/20 22:17  10/28/20 22:35





  Tylenol -  PO   650 mg





  Q6H PRN   Administration





  Fever Or Pain 7-10  


 


Clonidine  0.1 mg  10/29/20 09:41 





  Catapres -  PO  10/31/20 23:59 





  Q4H PRN  





  Withdrawal Symptoms  


 


Folic Acid  1 mg  10/29/20 10:00  10/29/20 10:38





  Folic Acid -  PO   1 mg





  DAILY LUZ   Administration


 


Vancomycin HCl 1,500 mg/  500 mls @ 250 mls/hr  10/29/20 03:00  10/29/20 04:50





  Dextrose  IVPB  10/29/20 16:59  Not Given





  Q12H LUZ  





  Protocol  


 


Aztreonam 2 gm/ Dextrose  100 mls @ 100 mls/hr  10/29/20 03:00  10/29/20 04:49





  IVPB  10/29/20 15:59  Not Given





  Q12H LUZ  





  Protocol  


 


Aztreonam 2 gm/ Dextrose  100 mls @ 100 mls/hr  10/30/20 03:15 





  IVPB  





  Q12H LUZ  





  Protocol  


 


Vancomycin HCl 1,500 mg/  500 mls @ 250 mls/hr  10/30/20 03:15 





  Dextrose  IVPB  





  Q12H LUZ  





  Protocol  


 


Vancomycin HCl 1,500 mg/  500 mls @ 250 mls/hr  10/29/20 11:45 





  Dextrose  IVPB  





  Q24H LUZ  





  Protocol  


 


Aztreonam 1 gm/ Dextrose  50 mls @ 100 mls/hr  10/29/20 11:45 





  IVPB  





  Q8H-IV LUZ  





  Protocol  


 


Methadone HCl  5 mg  11/03/20 06:00 





  Dolophine -  PO  11/03/20 06:01 





  ONCE@0600 ONE  


 


Methadone HCl  10 mg  11/02/20 10:00 





  Dolophine -  PO  11/02/20 10:01 





  ONCE ONE  


 


Methadone HCl  20 mg  10/31/20 10:00 





  Dolophine -  PO  10/31/20 10:01 





  ONCE ONE  


 


Methadone HCl 20 mg/ Methadone  25 mg  10/30/20 10:00 





  HCl 5 mg  PO  10/30/20 10:01 





  ONCE ONE  


 


Methadone HCl 10 mg/ Methadone  15 mg  11/01/20 10:00 





  HCl 5 mg  PO  11/01/20 10:01 





  ONCE ONE  


 


Multivitamins/Minerals/Vitamin C  1 tab  10/29/20 10:00  10/29/20 10:38





  Tab-A-Vit -  PO   1 tab





  DAILY LUZ   Administration


 


Naloxone HCl  0.4 mg  10/29/20 09:41 





  Narcan -  IM  





  PRN PRN  





  RESPIRATORY DEPRESSION  


 


Thiamine HCl  100 mg  10/29/20 10:00  10/29/20 10:38





  Vitamin B1 -  PO   100 mg





  DAILY LUZ   Administration











ASSESSMENT/PLAN:








Problem List





- Problems


(1) Sepsis


Assessment/Plan: 


elevated WBC with abscess formation of foot, no fevers


blood cultures ordered


on vanco and aztronam


monitor for fevers


ID following


Code(s): A41.9 - SEPSIS, UNSPECIFIED ORGANISM   





(2) Foot abscess


Assessment/Plan: 


for I&D today with surgery.  reports severe pain of this foot.


Code(s): L02.619 - CUTANEOUS ABSCESS OF UNSPECIFIED FOOT   





(3) Opioid use disorder


Assessment/Plan: 


on methadone taper


addition medicine following


Code(s): F11.99 - OPIOID USE, UNSP WITH UNSPECIFIED OPIOID-INDUCED DISORDER   





(4) Male-to-female transgender person


Assessment/Plan: 


supportive care


patient will follow up with [place] for gender re assignment surgery next 

year


Code(s): F64.0 - TRANSSEXUALISM   





(5) DVT prophylaxis


Assessment/Plan: 


SCDs


Code(s): Z29.9 - ENCOUNTER FOR PROPHYLACTIC MEASURES, UNSPECIFIED   





Visit type





- Emergency Visit


Emergency Visit: Yes


ED Registration Date: 10/28/20


Care time: The patient presented to the Emergency Department on the above date 

and was hospitalized for further evaluation of their emergent condition.





- New Patient


This patient is new to me today: Yes


Date on this admission: 10/29/20





- Critical Care


Critical Care patient: No





- Discharge Referral


Referred to [facility] No

## 2020-10-29 NOTE — OP
Operative Note





- Note:


Operative Date: 10/29/20


Pre-Operative Diagnosis: abscess left foot


Operation: incision/drainage right foot


Findings: 





abscess dorsum right foot


Surgeon: Raymond Nur


Anesthesiologist/CRNA: Lashonda Jorge


Anesthesia: MAC


Estimated Blood Loss (mls): 5

## 2020-10-29 NOTE — EKG
Test Reason : 

Blood Pressure : ***/*** mmHG

Vent. Rate : 066 BPM     Atrial Rate : 066 BPM

   P-R Int : 122 ms          QRS Dur : 076 ms

    QT Int : 428 ms       P-R-T Axes : 066 064 043 degrees

   QTc Int : 448 ms

 

NORMAL SINUS RHYTHM

NONSPECIFIC T WAVE ABNORMALITY

ABNORMAL ECG

NO PREVIOUS ECGS AVAILABLE

Confirmed by HAYDEE HAYDEN, RAJEEV ([id number]) on 10/29/2020 3:50:06 PM

 

Referred By:             Confirmed By:RAJEEV HUBBARD MD

## 2020-10-29 NOTE — ECHO
______________________________________________________________________________



Name: ELVER CHENG R                               Exam:Adult Echocardiogram

MRN: [id number]           Study Date: 10/29/2020 02:41 PM

Age: 43 yrs

______________________________________________________________________________





______________________________________________________________________________





MMode/2D Measurements & Calculations

LVLd ap4: 7.5 cm                                        SV(MOD-sp4): 45.0 ml

EDV(MOD-sp4): 79.0 ml

LVLs ap4: 6.6 cm

ESV(MOD-sp4): 34.0 ml

_________________________________________________________



RV S Daniel: 11.2 cm/sec



Doppler Measurements & Calculations

MV E max daniel: 64.2 cm/sec                           Ao V2 max: 126.0 cm/sec

MV A max daniel: 46.4 cm/sec                           Ao max P.3 mmHg

MV E/A: 1.4

MV dec time: 0.19 sec

_____________________________________________________



LV V1 max P.4 mmHg                              Med Peak E' Daniel: 9.3 cm/sec

LV V1 max: 104.6 cm/sec                             Med E/e': 6.9

                                                    Lat Peak E' Daniel: 12.5 cm/sec

                                                    Lat E/e': 5.1



______________________________________________________________________________



Procedure

A complete two-dimensional transthoracic echocardiogram was performed (2D, M-mode, Doppler and color 
flow

Doppler).

Left Ventricle

The left ventricular size, thickness and function are normal. Ejection Fraction = 55-60%. No regional
 wall

motion abnormalities noted.

Right Ventricle

The right ventricle is normal in size and function.

Atria

Normal left and right atrial size and function.

Mitral Valve

There is no mitral regurgitation noted.

Tricuspid Valve

There is trace tricuspid regurgitation. There was insufficient TR detected to calculate RV systolic p
ressure.

Aortic Valve

No hemodynamically significant valvular aortic stenosis. No aortic regurgitation is present.

Pulmonic Valve

The pulmonic valve is not well visualized.

Great Vessels

The aortic root is not well visualized.

Pericardium/Pleura

There is no pericardial effusion.

______________________________________________________________________________





Interpretation Summary

The left ventricular size, thickness and function are normal

The right ventricle is normal in size and function.

There is trace tricuspid regurgitation.





MD William Doty 10/29/2020 05:38 PM

## 2020-10-29 NOTE — CONSULT
<Nalini Nuñez - Last Filed: 10/30/20 11:49>





- Consultation


REQUESTING PROVIDER: 





CONSULT REQUEST: We have been asked to surgically evaluate this patient for left

foot abscess





Hospitalist:Kelsea Granger NP





HISTORY OF PRESENT ILLNESS:


42 y/o F (transexual) w/ PMHx IV Heroin use with prior abscess, PTSD, 

Klinefelter syndrome, Depression, Hepatitis C, ?NIDDM sent from [facility] due to

multiple abscess on the b/l LE. Patient reports she has been using Heroin 

"heavily" overt the past few days. States multiple abscesses have formed over 

her legs some of which she has lanced with her drug needles. Reports a h/o MRSA 

after being admitted to [facility] in February, received a PICC line. Endorses 

subjective fever, chills and body aches, denies chest pain, headache, sob, 

N/V/D.  








PMHx:   as above       





PSHx:    denies       


                                Home Medications











 Medication  Instructions  Recorded


 


Emtricitabine/Tenofovir [Truvada -] 1 tablet PO DAILY 10/23/19


 


Estradiol Valerate [Delestrogen] 20 mg IM WEEKLY 10/23/19


 


Spironolactone 100 mg PO BID 10/23/19


 


Divalproex [Depakote -] 500 mg PO BID 10/28/20








                                    Allergies











Allergy/AdvReac Type Severity Reaction Status Date / Time


 


Penicillins AdvReac  Hives Verified 10/28/20 15:06








REVIEW OF SYSTEMS:


CONSTITUTIONAL: 


(+) fever, chills


CARDIOVASCULAR: 


Absent: chest pain, syncope


RESPIRATORY: 


Absent: cough, shortness of breath


GASTROINTESTINAL:


Absent: abdominal pain, abdominal distension, nausea, vomiting, diarrhea


SKIN: 


(+) abscess








PHYSICAL EXAM:


GENERAL: Awake, alert, and fully oriented, in no acute distress.


HEAD: Normal with no signs of trauma.


LUNGS: No accessory muscle use on RA


LOWER EXTREMITIES: L foot with large (approx 3.5x3.5cm) area of fluctuation. + 

erythema, + increased warmth, + ttp. Multiple small wounds on b/l thighs 

(abscesses that pt reports she drained) with + surrounding induration, no 

erythema. Scant seropurulent drainage. no ttp. no crepitus. 


Vasc: 2+ dp/pt b/l.  





                                   Vital Signs











Temperature  98.5 F   10/29/20 06:00


 


Pulse Rate  56 L  10/29/20 06:00


 


Respiratory Rate  20   10/29/20 06:00


 


Blood Pressure  119/69   10/29/20 06:00


 


O2 Sat by Pulse Oximetry (%)  98   10/29/20 06:00








                                   Lab Results











WBC  20.1 K/mm3 (4.0-10.0)  H  10/28/20  17:00    


 


RBC  4.48 M/mm3 (4.00-5.60)   10/28/20  17:00    


 


Hgb  13.6 GM/dL (11.7-16.9)   10/28/20  17:00    


 


Hct  39.5 % (35.4-49)   10/28/20  17:00    


 


MCV  88.0 fl (80-96)   10/28/20  17:00    


 


MCHC  34.3 g/dl (32.0-35.9)   10/28/20  17:00    


 


RDW  14.6 % (11.9-15.9)   10/28/20  17:00    


 


Plt Count  272 K/MM3 (134-434)  D 10/28/20  17:00    


 


INR  1.17  (0.83-1.09)  H  10/28/20  17:00    


 


Sodium  129 mmol/L (136-145)  L  10/28/20  17:00    


 


Potassium  3.9 mmol/L (3.5-5.1)   10/28/20  17:00    


 


Chloride  96 mmol/L ()  L  10/28/20  17:00    


 


Carbon Dioxide  25 mmol/L (21-32)   10/28/20  17:00    


 


Anion Gap  9 MMOL/L (8-16)   10/28/20  17:00    


 


BUN  13.9 mg/dL (7-18)   10/28/20  17:00    


 


Creatinine  1.0 mg/dL (0.55-1.3)   10/28/20  17:00    


 


Random Glucose  98 mg/dL ()   10/28/20  17:00    


 


Calcium  9.0 mg/dL (8.5-10.1)   10/28/20  17:00    











A/P: 42 y/o F (transexual) w/ PMHx IV Heroin use with prior abscess, PTSD, Kl

inefelter syndrome, Depression, Hepatitis C, ?NIDDM sent from [facility] due to 

multiple abscess on the b/l LE





afebrile, vss


leukocytosis 20k with shift


+large abscess L foot, multiple other draining abscesses on legs





-will d/w attending drainage of abscess in OR


-IV abx per ID


-correct electrolytes


-will follow up by afternoon





d/w attending Dr Nur








<Raymond Nur - Last Filed: 11/03/20 11:32>





- Consultation


Attending Surgeon:


I personally saw and examined the patient. My examination reveals a patient with

an abscess of the dorsum of the left foot. I discussed the case with the 

surgical PA and agree with their findings and plan of care with any exceptions 

as noted. ~ Raymond Nur MD, FACS

## 2020-10-29 NOTE — CONSULT
Consult Detox [facility]


Reason for Current Admission/Consult: Opioid withdrawal


Referred by:: Dr. Shekhar Loco





- History


History of Present Illness: 





Raul Diaz is a 42 yo man transitioning to female who presented yesterday

to [facility] requesting admission to detox for heroin use disorder.


During the rapid medical evaluation the patient was found to have an abscess 

over the dorsum of the left foot and was transferred to [facility] for care. 


Raul received methadone 10 mg prior to departure from [facility] for 

withdrawal symptoms. 


Pacheco is now admitted to [facility] for treatment of multiple abscesses due to 

IVDU.





Per the history and physical on admission: 


43 y.o. F PMHx of HIV use in the process of transitioning M to F w/ breast 

augmentation in 2019 presenting from [facility] due to multiple abscess on the 

b/l LE. Patient states he has been injecting heroin in his legs and this last 

batch caused him to break out in multiple abscess one on the L calf, L foot and 

R foot. Patient had been admitted at [facility] back in February due 

to abscess w/ MRSA infection requiring IV antibiotics through a PICC line. He 

states he tried to drain 2 of the abscess several days ago and they are now open

lesions that are very painful. He reports fever, chills and body aches but 

denies chest pain, headache, sob, N/V/D. Family Hx is significant for his 

Fathers death at 49 due to a heart condition and mother  at 53 due to 

diabetes. 





Substance Use History


  ** Heroin


Substance amount: 1-2 bundles


Frequency of use: Daily


Substance route: Injection (ex: intravenous or skin popping)


Date of Last Use: 10/26/20 (started age 21)





- History Source


History Provided By: Medical Record





- Alcohol/Substance Use


Hx Alcohol Use: Yes





Assessment Plan





- Diagnosis


(1) Opioid use disorder


Status: Acute   





- Plan


Plan: 





1. Serial COWS score, at least every 8 hours


2. monitor vitals


3. methadone detox protocol





- Medication


Detox Regimen/Protocol: Methadone

## 2020-10-30 NOTE — PN
Progress Note (short form)





- Note


Progress Note: 





Surgery:





PT premedicated with with IV morphine(2mg) for pain control prior to dressing 

change. 





                                   Vital Signs











 Period  Temp  Pulse  Resp  BP Sys/Eagn  Pulse Ox


 


 Last 24 Hr  97.5 F-98.1 F  52-63  16-20  106-130/58-85  








GEN: A&0x3, NAD


Left foot: irrigated with NS and removed iodoform packing. no purulent drainage.

mild erythema and swelling to foot. 


Right lateral ankle: small puncture site with minimal erythema drainage. Tissue 

sloughing.approximately 1x1cm wound. No swelling.





                                    CBC, BMP





                                 10/30/20 06:00 





                                 10/30/20 06:00 





A/p: 44 yo male s/p Left foot I&D, POD#1


   Daily wound care with dressing changes


   IV abx, culture pending


   D/w Dr. Nur

## 2020-10-30 NOTE — PN
Progress Note (short form)





- Note


Progress Note: 





Anesthesia post op


Pt seen and examined


S:Alert and awake


O:


                                   Vital Signs











Temperature  97.9 F   10/30/20 06:00


 


Pulse Rate  55 L  10/30/20 06:00


 


Respiratory Rate  18   10/30/20 06:00


 


Blood Pressure  111/71   10/30/20 06:00


 


O2 Sat by Pulse Oximetry (%)  99   10/30/20 06:00








                                    CBC, BMP





                                 10/28/20 17:00 





                                 10/28/20 17:00 








A/P


Current Active Problems





Cellulitis (Acute)


DVT prophylaxis (Acute)


Foot abscess (Acute)


Male-to-female transgender person (Acute)


Opioid use disorder (Acute)


Sepsis (Acute)





s/p I&D left foot


Doing well post op


Continue current care.


Juve Yadav MD

## 2020-10-30 NOTE — PN
Progress Note, Physician


History of Present Illness: 





s/p drainage of the abscess


stable





- Current Medication List


Current Medications: 


Active Medications





Acetaminophen (Tylenol -)  650 mg PO Q6H PRN


   PRN Reason: Fever Or Pain 7-10


   Last Admin: 10/30/20 06:56 Dose:  650 mg


   Documented by: 


Clonidine (Catapres -)  0.1 mg PO Q4H PRN


   PRN Reason: Withdrawal Symptoms


   Stop: 10/31/20 23:59


Folic Acid (Folic Acid -)  1 mg PO DAILY [facility]


   Last Admin: 10/30/20 09:17 Dose:  1 mg


   Documented by: 


Heparin Sodium (Porcine) (Heparin -)  5,000 unit SQ BID [facility]


Aztreonam 1 gm/ Dextrose  50 mls @ 100 mls/hr IVPB Q8H-IV [facility]; Protocol


   Last Admin: 10/30/20 09:18 Dose:  100 mls/hr


   Documented by: 


Vancomycin HCl 1,500 mg/ (Dextrose)  500 mls @ 250 mls/hr IVPB Q24H [facility]; 

Protocol


   Last Admin: 10/29/20 22:11 Dose:  250 mls/hr


   Documented by: 


Methadone HCl (Dolophine -)  5 mg PO ONCE@0600 ONE


   Stop: 11/03/20 06:01


Methadone HCl (Dolophine -)  10 mg PO ONCE ONE


   Stop: 11/02/20 10:01


Methadone HCl (Dolophine -)  20 mg PO ONCE ONE


   Stop: 10/31/20 10:01


Methadone HCl 10 mg/ Methadone (HCl 5 mg)  15 mg PO ONCE ONE


   Stop: 11/01/20 10:01


Multivitamins/Minerals/Vitamin C (Tab-A-Vit -)  1 tab PO DAILY [facility]


   Last Admin: 10/30/20 09:17 Dose:  1 tab


   Documented by: 


Naloxone HCl (Narcan -)  0.4 mg IM PRN PRN


   PRN Reason: RESPIRATORY DEPRESSION


Thiamine HCl (Vitamin B1 -)  100 mg PO DAILY [facility]


   Last Admin: 10/30/20 09:17 Dose:  100 mg


   Documented by: 











- Objective


Vital Signs: 


                                   Vital Signs











Temperature  97.9 F   10/30/20 06:00


 


Pulse Rate  55 L  10/30/20 06:00


 


Respiratory Rate  18   10/30/20 06:00


 


Blood Pressure  111/71   10/30/20 06:00


 


O2 Sat by Pulse Oximetry (%)  99   10/30/20 06:00











Constitutional: Yes: No Distress, Calm


Cardiovascular: Yes: S1, S2


Respiratory: Yes: Regular, CTA Bilaterally


Gastrointestinal: Yes: Normal Bowel Sounds, Soft


Musculoskeletal: Yes: WNL


Extremities: Yes: Other


Wound/Incision: Yes: Dressing Dry and Intact


Neurological: Yes: Alert, Oriented


Psychiatric: Yes: Alert, Oriented


Labs: 


                                    CBC, BMP





                                 10/30/20 06:00 





                                 10/30/20 06:00 





                                    INR, PTT











INR  1.17  (0.83-1.09)  H  10/28/20  17:00    














Assessment/Plan





Problem List





- Problems


(1) Sepsis


Code(s): A41.9 - SEPSIS, UNSPECIFIED ORGANISM   





(2) Foot abscess


Code(s): L02.619 - CUTANEOUS ABSCESS OF UNSPECIFIED FOOT   





(3) Opioid use disorder


Code(s): F11.99 - OPIOID USE, UNSP WITH UNSPECIFIED OPIOID-INDUCED DISORDER   





(4) Male-to-female transgender person


Code(s): F64.0 - TRANSSEXUALISM  





leukocytosis





plan


continue abx


wound care


await for cx report


rest as per the team

## 2020-10-30 NOTE — PN
Physical Exam: 


SUBJECTIVE: Patient seen and examined at the bedside.  She is laying in bed, 

just ate breakfast and tells me she feels well. 





She is s/p I&D of left foot abscess and having some pain, but tolerable at the 

moment. 





Patient goes by the name Geraldine.  She tells me that she is transgender: male to

female and is seeking gender transformation at [place] next year.  She 

injects heroin and often uses her foot and developed an abscess of her anterior 

left foot.  Prior to this admission, she developed an abscess on her left calf 

and right ankle and lanced it herself with a syringe.  Admits to using heroin 

since age of 21. 





She wants to stop using heroin and now on a methadone taper with [facility]. 





OBJECTIVE:


years of injecting heroin  use-since age 21, s/p echo on 10/29


blood cultures pending and ID following


----


Patient is a 43 year old transgender female with history of heroin use since the

age of 21. Patient is a male by birth but identifies as a female and will be 

addressed as a female as per her wishes.  She presents from [facility] with 

multiple abscess on bilateral lower ext.  She tells me she self lanced the one 

of her right ankle and left calf with a syringe.  She developed a large painful 

abscess on the anterior portion of the left foot and is s/p I&D on 10/29 with 

Dr. Nur.  wound dressing intact.  toes slightly edematous. 





Again, discussed with her the dangers of lancing her own wounds as it can cause 

a worse infection such as a blood infection or osteomyelitis.  





                                   Vital Signs











 Period  Temp  Pulse  Resp  BP Sys/Egan  Pulse Ox


 


 Last 24 Hr  97.5 F-98.1 F  52-63  16-20  106-130/58-85  








GENERAL: The patient is awake, alert, and fully oriented, in no acute distress.


HEAD: Normal with no signs of trauma.  multiple facial tattoos


EYES: PERRL, extraocular movements intact, sclera anicteric, conjunctiva clear. 

No ptosis. 


ENT: Ears normal, nares patent, oropharynx clear without exudates, moist mucous 

membranes.


NECK: Trachea midline, full range of motion, supple. 


LUNGS: Breath sounds equal, clear to auscultation bilaterally, no wheezes 


HEART: Regular rate and rhythm 


ABDOMEN: Soft, nontender, nondistended, normoactive bowel  


EXTREMITIES: right medial wound, left calf wound, left anterior foot with large 

abscess s/p I&D, wound dressing dry/intact.  only able to view toes which were 

mildy swollen - elevated on pillows


NEUROLOGICAL: normal speech, gait not observed.


PSYCH: Normal mood, normal affect.





                         Laboratory Results - last 24 hr











  10/28/20 10/29/20 10/29/20





  16:49 06:10 21:05


 


WBC   


 


RBC   


 


Hgb   


 


Hct   


 


MCV   


 


MCH   


 


MCHC   


 


RDW   


 


Plt Count   


 


MPV   


 


Absolute Neuts (auto)   


 


Neutrophils %   


 


Lymphocytes %   


 


Monocytes %   


 


Eosinophils %   


 


Basophils %   


 


Nucleated RBC %   


 


Sodium   


 


Potassium   


 


Chloride   


 


Carbon Dioxide   


 


Anion Gap   


 


BUN   


 


Creatinine   


 


Est GFR (CKD-EPI)AfAm   


 


Est GFR (CKD-EPI)NonAf   


 


Random Glucose   


 


Calcium   


 


Magnesium   


 


Total Bilirubin   


 


AST   


 


ALT   


 


Alkaline Phosphatase   


 


Total Protein   


 


Albumin   


 


Triglycerides    187 H


 


Cholesterol    132


 


Total LDL Cholesterol    81


 


HDL Cholesterol    31 L


 


Urine Osmolality   251 L 


 


COVID-19 ([facility])  Not detected  














  10/30/20 10/30/20





  06:00 06:00


 


WBC  14.0 H 


 


RBC  4.28 


 


Hgb  12.8 


 


Hct  38.5 


 


MCV  89.9 


 


MCH  29.9 


 


MCHC  33.2 


 


RDW  14.9 


 


Plt Count  245 


 


MPV  8.0 


 


Absolute Neuts (auto)  8.9 H 


 


Neutrophils %  63.8 


 


Lymphocytes %  21.7  D 


 


Monocytes %  13.3 H 


 


Eosinophils %  0.7  D 


 


Basophils %  0.5 


 


Nucleated RBC %  0 


 


Sodium   135 L


 


Potassium   4.0


 


Chloride   99


 


Carbon Dioxide   29


 


Anion Gap   7 L


 


BUN   16.1


 


Creatinine   1.0


 


Est GFR (CKD-EPI)AfAm   106.36


 


Est GFR (CKD-EPI)NonAf   91.77


 


Random Glucose   70 L


 


Calcium   8.6


 


Magnesium   2.2


 


Total Bilirubin   0.4


 


AST   16


 


ALT   21


 


Alkaline Phosphatase   57


 


Total Protein   7.3


 


Albumin   2.6 L


 


Triglycerides  


 


Cholesterol  


 


Total LDL Cholesterol  


 


HDL Cholesterol  


 


Urine Osmolality  


 


COVID-19 ([facility])  








Active Medications











Generic Name Dose Route Start Last Admin





  Trade Name Freq  PRN Reason Stop Dose Admin


 


Acetaminophen  650 mg  10/29/20 18:27  10/30/20 06:56





  Tylenol -  PO   650 mg





  Q6H PRN   Administration





  Fever Or Pain 7-10  


 


Clonidine  0.1 mg  10/29/20 18:27 





  Catapres -  PO  10/31/20 23:59 





  Q4H PRN  





  Withdrawal Symptoms  


 


Folic Acid  1 mg  10/30/20 10:00  10/30/20 09:17





  Folic Acid -  PO   1 mg





  DAILY LUZ   Administration


 


Aztreonam 1 gm/ Dextrose  50 mls @ 100 mls/hr  10/29/20 19:45  10/30/20 09:18





  IVPB   100 mls/hr





  Q8H-IV LUZ   Administration





  Protocol  


 


Vancomycin HCl 1,500 mg/  500 mls @ 250 mls/hr  10/29/20 20:00  10/29/20 22:11





  Dextrose  IVPB   250 mls/hr





  Q24H LUZ   Administration





  Protocol  


 


Methadone HCl  5 mg  11/03/20 06:00 





  Dolophine -  PO  11/03/20 06:01 





  ONCE@0600 ONE  


 


Methadone HCl  10 mg  11/02/20 10:00 





  Dolophine -  PO  11/02/20 10:01 





  ONCE ONE  


 


Methadone HCl  20 mg  10/31/20 10:00 





  Dolophine -  PO  10/31/20 10:01 





  ONCE ONE  


 


Methadone HCl 20 mg/ Methadone  25 mg  10/30/20 10:00  10/30/20 09:39





  HCl 5 mg  PO  10/30/20 10:01  25 mg





  ONCE ONE   Administration


 


Methadone HCl 10 mg/ Methadone  15 mg  11/01/20 10:00 





  HCl 5 mg  PO  11/01/20 10:01 





  ONCE ONE  


 


Multivitamins/Minerals/Vitamin C  1 tab  10/30/20 10:00  10/30/20 09:17





  Tab-A-Vit -  PO   1 tab





  DAILY LUZ   Administration


 


Naloxone HCl  0.4 mg  10/29/20 19:39 





  Narcan -  IM  





  PRN PRN  





  RESPIRATORY DEPRESSION  


 


Thiamine HCl  100 mg  10/30/20 10:00  10/30/20 09:17





  Vitamin B1 -  PO   100 mg





  DAILY LUZ   Administration











ASSESSMENT/PLAN:








Problem List





- Problems


(1) Sepsis


Assessment/Plan: 


elevated WBC with abscess formation of foot, no fevers


blood cultures ordered


on vanco and aztronam


monitor for fevers


ID following


Code(s): A41.9 - SEPSIS, UNSPECIFIED ORGANISM   





(2) Foot abscess


Assessment/Plan: 


s/p I&D on 10/28/2020.  wound dressings daily. 


on antibiotics pe ID


Code(s): L02.619 - CUTANEOUS ABSCESS OF UNSPECIFIED FOOT   





(3) Opioid use disorder


Assessment/Plan: 


on methadone taper


addition medicine following


Code(s): F11.99 - OPIOID USE, UNSP WITH UNSPECIFIED OPIOID-INDUCED DISORDER   





(4) Male-to-female transgender person


Assessment/Plan: 


supportive care


patient will follow up with [place] for gender re assignment surgery next 

year


Code(s): F64.0 - TRANSSEXUALISM   





(5) High triglycerides


Assessment/Plan: 


diet control an repeat as an outpatient before starting therapy.


Code(s): E78.1 - PURE HYPERGLYCERIDEMIA   





(6) DVT prophylaxis


Assessment/Plan: 


SCDs


Code(s): Z29.9 - ENCOUNTER FOR PROPHYLACTIC MEASURES, UNSPECIFIED   





Visit type





- Emergency Visit


Emergency Visit: Yes


ED Registration Date: 10/28/20


Care time: The patient presented to the Emergency Department on the above date 

and was hospitalized for further evaluation of their emergent condition.





- New Patient


This patient is new to me today: No





- Critical Care


Critical Care patient: No





- Discharge Referral


Referred to [facility] No

## 2020-10-31 NOTE — OP
DATE OF OPERATION:  10/29/2020

 

PREOPERATIVE DIAGNOSIS:  Soft tissue abscess of the left foot.

 

POSTOPERATIVE DIAGNOSIS:  Soft tissue abscess of the left foot.

 

PROCEDURE PERFORMED:  Incision and drainage of soft tissue abscess of the left 
foot. 

 

SURGEON:  Raymond Nur MD

 

ANESTHESIA:  Local with IV sedation.

 

OPERATIVE FINDINGS:  An abscess of the soft tissue of the dorsum of the left 
foot. 

The rest of the findings were unremarkable. 

 

DESCRIPTION OF PROCEDURE:  The patient was placed on the operating table in the

supine position, and the left foot was prepped with ChloraPrep and draped in 
sterile

fashion.  A time-out was taken.  The area of the abscess was injected with 1%

Xylocaine and 0.5% Marcaine in equal concentration.  After appropriate 
intravenous

sedation, an incision was made with a scalpel and taken down through the skin 
and

subcutaneous tissue.  Abundant purulent drainage was obtained and a portion sent
for

culture and sensitivity.  

 

The wound was irrigated with saline and peroxide, and hemostasis secured with

electrocautery, and the wound packed with 1/2-inch Iodoform gauze.  Dry sterile

dressings of Damaris and a 4-inch Ace bandage were placed, and the procedure 
terminated

at this point.  

 

The patient was transferred to the postanesthesia care unit in stable condition,

awake and alert.

 

ESTIMATED BLOOD LOSS:  5 mL.

 

REPLACEMENT:  Crystalloid. 

 

DRAINS:  None.

 

SPECIMEN:  None. 

 

I, Raymond Nur, was physically present in the operating room from the time the

patient was placed on the operating room table until the patient was transferred
to

the postanesthesia care unit in my accompaniment. 

 

 

MD PASHA Freeman/[id number]

DD: 10/30/2020 13:25

DT: 10/31/2020 17:45

Job #:  [id number]

[facility]

## 2020-10-31 NOTE — PN
Physical Exam: 


SUBJECTIVE: Patient seen and examined








OBJECTIVE:





                                   Vital Signs











 Period  Temp  Pulse  Resp  BP Sys/Egan  Pulse Ox


 


 Last 24 Hr  97.4 F-98.4 F  49-60  18-20  /60-75  97-99











GENERAL: The patient is awake, alert, and fully oriented, in no acute distress.


HEAD: Normal with no signs of trauma.


EYES: PERRL, extraocular movements intact, sclera anicteric, conjunctiva clear. 

No ptosis. 


ENT: She has tattoes on her face, oropharynx clear without exudates, moist 

mucous 


membranes.


NECK: Trachea midline, full range of motion, supple. 


LUNGS: Breath sounds equal, clear to auscultation bilaterally, no wheezes, no 

crackles, no 


accessory muscle use. 


HEART: Regular rate and rhythm, S1, S2 without murmur, rub or gallop.


ABDOMEN: Soft, nontender, nondistended, normoactive bowel sounds, no guarding, 

no 


rebound, no hepatosplenomegaly, no masses.


EXTREMITIES: She has tattoes on bilateral upper and lower extremities


NEUROLOGICAL: Cranial nerves II through XII grossly intact. Normal speech, gait 

not 


observed.


PSYCH: Normal mood, normal affect.


SKIN: Warm, dry, normal turgor, no rashes or lesions noted. Covered with tattoes

on face, neck, bilateral upper and lower extremities. Her left foot is covered 

with dressing with toes and heel exposed which all appear normal in color. She 

refuses to disturb dressing














                         Laboratory Results - last 24 hr











  10/31/20 10/31/20





  07:30 07:30


 


WBC  10.6 H 


 


RBC  4.23 


 


Hgb  13.0 


 


Hct  38.4 


 


MCV  90.8 


 


MCH  30.7 


 


MCHC  33.8 


 


RDW  14.4 


 


Plt Count  251 


 


MPV  8.2 


 


Absolute Neuts (auto)  5.7 


 


Neutrophils %  54.1 


 


Lymphocytes %  32.1  D 


 


Monocytes %  10.3 H 


 


Eosinophils %  2.7  D 


 


Basophils %  0.8 


 


Nucleated RBC %  0 


 


Sodium   136


 


Potassium   4.1


 


Chloride   101


 


Carbon Dioxide   29


 


Anion Gap   6 L


 


BUN   11.1


 


Creatinine   0.8


 


Est GFR (CKD-EPI)AfAm   126.81


 


Est GFR (CKD-EPI)NonAf   109.41


 


Random Glucose   71 L


 


Calcium   8.6


 


Magnesium   2.0


 


Total Bilirubin   0.2


 


AST   21


 


ALT   21


 


Alkaline Phosphatase   53


 


Total Protein   7.2


 


Albumin   2.5 L








Active Medications











Generic Name Dose Route Start Last Admin





  Trade Name Monae  PRN Reason Stop Dose Admin


 


Acetaminophen  650 mg  10/29/20 18:27  10/31/20 09:13





  Tylenol -  PO   650 mg





  Q6H PRN   Administration





  Fever Or Pain 7-10  


 


Clonidine  0.1 mg  10/29/20 18:27 





  Catapres -  PO  10/31/20 23:59 





  Q4H PRN  





  Withdrawal Symptoms  


 


Folic Acid  1 mg  10/30/20 10:00  10/31/20 09:14





  Folic Acid -  PO   1 mg





  DAILY LUZ   Administration


 


Heparin Sodium (Porcine)  5,000 unit  10/30/20 22:00  10/31/20 09:14





  Heparin -  SQ   5,000 unit





  BID LUZ   Administration


 


Aztreonam 1 gm/ Dextrose  50 mls @ 100 mls/hr  10/29/20 19:45  10/31/20 09:14





  IVPB   100 mls/hr





  Q8H-IV LUZ   Administration





  Protocol  


 


Vancomycin HCl 1,500 mg/  500 mls @ 250 mls/hr  10/29/20 20:00  10/30/20 20:15





  Dextrose  IVPB   250 mls/hr





  Q24H LUZ   Administration





  Protocol  


 


Methadone HCl  5 mg  11/03/20 06:00 





  Dolophine -  PO  11/03/20 06:01 





  ONCE@0600 ONE  


 


Methadone HCl  10 mg  11/02/20 10:00 





  Dolophine -  PO  11/02/20 10:01 





  ONCE ONE  


 


Methadone HCl 10 mg/ Methadone  15 mg  11/01/20 10:00 





  HCl 5 mg  PO  11/01/20 10:01 





  ONCE ONE  


 


Multivitamins/Minerals/Vitamin C  1 tab  10/30/20 10:00  10/31/20 09:14





  Tab-A-Vit -  PO   1 tab





  DAILY LUZ   Administration


 


Naloxone HCl  0.4 mg  10/29/20 19:39 





  Narcan -  IM  





  PRN PRN  





  RESPIRATORY DEPRESSION  


 


Thiamine HCl  100 mg  10/30/20 10:00  10/31/20 09:34





  Vitamin B1 -  PO   100 mg





  DAILY LUZ   Administration











ASSESSMENT/PLAN:


43 year old transgender female with history of heroin use since the age of 21. 

Patient is a male by birth but identifies as a female and will be addressed as a

female as per her wishes.  She presents from [facility] with multiple abscess on 

bilateral lower ext.  She tells me she self lances her abscesses on occasions 

and she developed a large painful abscess on the anterior portion of the left 

foot and is s/p I&D on 10/29 with Dr. Nur.  wound dressing intact. Again, 

discussed with her the dangers of lancing her own wounds as it can cause a worse

infection such as a blood infection or osteomyelitis.  








(1) Left Foot abscess so I and D 10/29


elevated WBC with abscess formation of foot, no fevers


blood cultures so far shows no growth


on vanco and aztronam; ID following





(2) Opioid use disorder


on methadone taper


addiction medicine following





(3) Male-to-female transgender person


supportive care


patient will follow up with [facility] for gender re assignment surgery next 

year





(4) High triglycerides 


diet control an repeat as an outpatient before starting therapy.





(5) DVT prophylaxis


SCDs


encouraged to get OOB to chair








                                        








Visit type





- Emergency Visit


Emergency Visit: No





- New Patient


This patient is new to me today: Yes


Date on this admission: 10/31/20





- Critical Care


Critical Care patient: No





- Discharge Referral


Referred to [facility] No

## 2020-10-31 NOTE — PN
Progress Note, Physician


History of Present Illness: 





stable


no new issues





- Current Medication List


Current Medications: 


Active Medications





Acetaminophen (Tylenol -)  650 mg PO Q6H PRN


   PRN Reason: Fever Or Pain 7-10


   Last Admin: 10/31/20 09:13 Dose:  650 mg


   Documented by: 


Clonidine (Catapres -)  0.1 mg PO Q4H PRN


   PRN Reason: Withdrawal Symptoms


   Stop: 10/31/20 23:59


Folic Acid (Folic Acid -)  1 mg PO DAILY [facility]


   Last Admin: 10/31/20 09:14 Dose:  1 mg


   Documented by: 


Heparin Sodium (Porcine) (Heparin -)  5,000 unit SQ BID [facility]


   Last Admin: 10/31/20 09:14 Dose:  5,000 unit


   Documented by: 


Aztreonam 1 gm/ Dextrose  50 mls @ 100 mls/hr IVPB Q8H-IV [facility]; Protocol


   Last Admin: 10/31/20 09:14 Dose:  100 mls/hr


   Documented by: 


Vancomycin HCl 1,500 mg/ (Dextrose)  500 mls @ 250 mls/hr IVPB Q24H [facility]; 

Protocol


   Last Admin: 10/30/20 20:15 Dose:  250 mls/hr


   Documented by: 


Methadone HCl (Dolophine -)  5 mg PO ONCE@0600 ONE


   Stop: 11/03/20 06:01


Methadone HCl (Dolophine -)  10 mg PO ONCE ONE


   Stop: 11/02/20 10:01


Methadone HCl 10 mg/ Methadone (HCl 5 mg)  15 mg PO ONCE ONE


   Stop: 11/01/20 10:01


Multivitamins/Minerals/Vitamin C (Tab-A-Vit -)  1 tab PO DAILY [facility]


   Last Admin: 10/31/20 09:14 Dose:  1 tab


   Documented by: 


Naloxone HCl (Narcan -)  0.4 mg IM PRN PRN


   PRN Reason: RESPIRATORY DEPRESSION


Thiamine HCl (Vitamin B1 -)  100 mg PO DAILY [facility]


   Last Admin: 10/31/20 09:34 Dose:  100 mg


   Documented by: 











- Objective


Vital Signs: 


                                   Vital Signs











Temperature  97.8 F   10/31/20 06:00


 


Pulse Rate  49 L  10/31/20 06:00


 


Respiratory Rate  20   10/31/20 06:00


 


Blood Pressure  112/68   10/31/20 06:00


 


O2 Sat by Pulse Oximetry (%)  97   10/31/20 09:00











Constitutional: Yes: No Distress, Calm


Cardiovascular: Yes: S1, S2


Respiratory: Yes: Regular, CTA Bilaterally


Gastrointestinal: Yes: Normal Bowel Sounds, Soft


Musculoskeletal: Yes: WNL


Extremities: Yes: WNL


Integumentary: Yes: Other


Neurological: Yes: Alert, Oriented


Psychiatric: Yes: Alert, Oriented


Labs: 


                                    CBC, BMP





                                 10/31/20 07:30 





                                 10/31/20 07:30 





                                    INR, PTT











INR  1.17  (0.83-1.09)  H  10/28/20  17:00    














Assessment/Plan





Problem List





- Problems


(1) Sepsis


Code(s): A41.9 - SEPSIS, UNSPECIFIED ORGANISM   





(2) Foot abscess


Code(s): L02.619 - CUTANEOUS ABSCESS OF UNSPECIFIED FOOT   





(3) Opioid use disorder


Code(s): F11.99 - OPIOID USE, UNSP WITH UNSPECIFIED OPIOID-INDUCED DISORDER   





(4) Male-to-female transgender person


Code(s): F64.0 - TRANSSEXUALISM  





leukocytosis





plan


continue abx


wound care


await for cx report


rest as per the team

## 2020-11-01 NOTE — PN
Physical Exam: 


SUBJECTIVE: Patient seen and examined. He feels fine. 








OBJECTIVE:





                                   Vital Signs











 Period  Temp  Pulse  Resp  BP Sys/Egan  Pulse Ox


 


 Last 24 Hr  98.3 F-98.3 F  54-64  20-20  110-121/65-70  96-98











GENERAL: The patient is awake, alert, and fully oriented, in no acute distress.


HEAD: Normal with no signs of trauma.


EYES: PERRL, extraocular movements intact, sclera anicteric, conjunctiva clear. 

No ptosis. 


ENT: Ears normal, nares patent, oropharynx clear without exudates, moist mucous 


membranes.


NECK: Trachea midline, full range of motion, supple. 


LUNGS: Breath sounds equal, clear to auscultation bilaterally, no wheezes, no 

crackles, no 


accessory muscle use. 


HEART: Regular rate and rhythm, S1, S2 without murmur, rub or gallop.


ABDOMEN: Soft, nontender, nondistended, normoactive bowel sounds, no guarding, 

no 


rebound, no hepatosplenomegaly, no masses.


EXTREMITIES: 2+ pulses, warm, well-perfused, no edema. Left foot with bandage. 


NEUROLOGICAL: Cranial nerves II through XII grossly intact. Normal speech, gait 

not observed.


PSYCH: Normal mood, normal affect.


SKIN: Warm, dry, normal turgor, no rashes or lesions noted. He has tattoes on 

face, arms, torso, lower extremities. 














                         Laboratory Results - last 24 hr











  11/01/20 11/01/20





  06:00 06:00


 


WBC  10.4 H 


 


RBC  4.45 


 


Hgb  13.5 


 


Hct  40.0 


 


MCV  89.8 


 


MCH  30.2 


 


MCHC  33.7 


 


RDW  14.4 


 


Plt Count  294 


 


MPV  8.0 


 


Absolute Neuts (auto)  5.6 


 


Neutrophils %  53.6 


 


Lymphocytes %  33.0 


 


Monocytes %  9.1 


 


Eosinophils %  3.6 


 


Basophils %  0.7 


 


Nucleated RBC %  0 


 


Sodium   136


 


Potassium   4.1


 


Chloride   102


 


Carbon Dioxide   29


 


Anion Gap   6 L


 


BUN   10.9


 


Creatinine   0.8


 


Est GFR (CKD-EPI)AfAm   126.81


 


Est GFR (CKD-EPI)NonAf   109.41


 


Random Glucose   70 L


 


Calcium   8.9


 


Magnesium   2.1


 


Total Bilirubin   0.3


 


AST   26


 


ALT   28


 


Alkaline Phosphatase   55


 


Total Protein   7.5


 


Albumin   2.6 L








Active Medications











Generic Name Dose Route Start Last Admin





  Trade Name Freq  PRN Reason Stop Dose Admin


 


Acetaminophen  650 mg  10/29/20 18:27  10/31/20 09:13





  Tylenol -  PO   650 mg





  Q6H PRN   Administration





  Fever Or Pain 7-10  


 


Folic Acid  1 mg  10/30/20 10:00  11/01/20 09:29





  Folic Acid -  PO   1 mg





  DAILY LUZ   Administration


 


Heparin Sodium (Porcine)  5,000 unit  10/30/20 22:00  11/01/20 09:28





  Heparin -  SQ   5,000 unit





  BID LUZ   Administration


 


Aztreonam 1 gm/ Dextrose  50 mls @ 100 mls/hr  10/29/20 19:45  11/01/20 09:27





  IVPB   100 mls/hr





  Q8H-IV LUZ   Administration





  Protocol  


 


Methadone HCl  5 mg  11/03/20 06:00 





  Dolophine -  PO  11/03/20 06:01 





  ONCE@0600 ONE  


 


Methadone HCl  10 mg  11/02/20 10:00 





  Dolophine -  PO  11/02/20 10:01 





  ONCE ONE  


 


Multivitamins/Minerals/Vitamin C  1 tab  10/30/20 10:00  11/01/20 09:29





  Tab-A-Vit -  PO   1 tab





  DAILY LUZ   Administration


 


Naloxone HCl  0.4 mg  10/29/20 19:39 





  Narcan -  IM  





  PRN PRN  





  RESPIRATORY DEPRESSION  


 


Thiamine HCl  100 mg  10/30/20 10:00  11/01/20 09:29





  Vitamin B1 -  PO   100 mg





  DAILY LUZ   Administration











ASSESSMENT/PLAN:


43 year old transgender female with history of heroin use since the age of 21. 

Patient is a male by birth but identifies as a female and will be addressed as a

female as per her wishes.  She presents from [facility] with multiple abscess on 

bilateral lower ext.  She tells me she self lances her abscesses on occasions 

and she developed a large painful abscess on the anterior portion of the left 

foot and is s/p I&D on 10/29 with Dr. Nur.  wound dressing intact. Again, 

discussed with her the dangers of lancing her own wounds as it can cause a worse

infection such as a blood infection or osteomyelitis.  








(1) Left Foot abscess so I and D 10/29


blood cultures showed serratia marcescens


vanco discontinued and continuing with aztreonam as per ID





(2) Opioid use disorder


on methadone taper


addiction medicine following





(3) Male-to-female transgender person


patient will follow up with [facility] for gender re assignment surgery next 

year


will resume her home meds for transgender patient. DW patient and nursing





(4) High triglycerides 


diet control and repeat as an outpatient before starting therapy.





(5) DVT prophylaxis


SCDs


encouraged to get OOB to chair








                                        











Visit type





- Emergency Visit


Emergency Visit: No





- New Patient


This patient is new to me today: No





- Critical Care


Critical Care patient: No





- Discharge Referral


Referred to [facility] No

## 2020-11-01 NOTE — PN
Progress Note, Physician


History of Present Illness: 





stable


no new issues





- Current Medication List


Current Medications: 


Active Medications





Acetaminophen (Tylenol -)  650 mg PO Q6H PRN


   PRN Reason: Fever Or Pain 7-10


   Last Admin: 10/31/20 09:13 Dose:  650 mg


   Documented by: 


Folic Acid (Folic Acid -)  1 mg PO DAILY [facility]


   Last Admin: 11/01/20 09:29 Dose:  1 mg


   Documented by: 


Heparin Sodium (Porcine) (Heparin -)  5,000 unit SQ BID [facility]


   Last Admin: 11/01/20 09:28 Dose:  5,000 unit


   Documented by: 


Aztreonam 1 gm/ Dextrose  50 mls @ 100 mls/hr IVPB Q8H-IV [facility]; Protocol


   Last Admin: 11/01/20 09:27 Dose:  100 mls/hr


   Documented by: 


Vancomycin HCl 1,500 mg/ (Dextrose)  500 mls @ 250 mls/hr IVPB Q24H [facility]; 

Protocol


   Last Admin: 10/31/20 21:49 Dose:  250 mls/hr


   Documented by: 


Methadone HCl (Dolophine -)  5 mg PO ONCE@0600 ONE


   Stop: 11/03/20 06:01


Methadone HCl (Dolophine -)  10 mg PO ONCE ONE


   Stop: 11/02/20 10:01


Multivitamins/Minerals/Vitamin C (Tab-A-Vit -)  1 tab PO DAILY [facility]


   Last Admin: 11/01/20 09:29 Dose:  1 tab


   Documented by: 


Naloxone HCl (Narcan -)  0.4 mg IM PRN PRN


   PRN Reason: RESPIRATORY DEPRESSION


Thiamine HCl (Vitamin B1 -)  100 mg PO DAILY [facility]


   Last Admin: 11/01/20 09:29 Dose:  100 mg


   Documented by: 











- Objective


Vital Signs: 


                                   Vital Signs











Temperature  98.3 F   11/01/20 06:00


 


Pulse Rate  57 L  11/01/20 06:00


 


Respiratory Rate  20   11/01/20 06:00


 


Blood Pressure  121/70   11/01/20 06:00


 


O2 Sat by Pulse Oximetry (%)  96   11/01/20 09:00











Constitutional: Yes: No Distress, Calm


Respiratory: Yes: Regular, CTA Bilaterally


Gastrointestinal: Yes: Normal Bowel Sounds, Soft


Musculoskeletal: Yes: WNL


Extremities: Yes: WNL


Neurological: Yes: Alert, Oriented


Psychiatric: Yes: Alert, Oriented


Labs: 


                                    CBC, BMP





                                 11/01/20 06:00 





                                 11/01/20 06:00 





                                    INR, PTT











INR  1.17  (0.83-1.09)  H  10/28/20  17:00    














Assessment/Plan





Problem List





- Problems


(1) Sepsis


Code(s): A41.9 - SEPSIS, UNSPECIFIED ORGANISM   





(2) Foot abscess


Code(s): L02.619 - CUTANEOUS ABSCESS OF UNSPECIFIED FOOT   





(3) Opioid use disorder


Code(s): F11.99 - OPIOID USE, UNSP WITH UNSPECIFIED OPIOID-INDUCED DISORDER   





(4) Male-to-female transgender person


Code(s): F64.0 - TRANSSEXUALISM  





leukocytosis





plan


continue abx


wound cx noted


will stop vanco

## 2020-11-02 NOTE — PN
Progress Note, Physician


History of Present Illness: 





seen and examined at bedside. Ednorses feeling better. Denies nausea vomiting 

fever chills chest pain or SOB. no symptoms of withdrawal. 





- Current Medication List


Current Medications: 


Active Medications





Acetaminophen (Tylenol -)  650 mg PO Q6H PRN


   PRN Reason: Fever Or Pain 7-10


   Last Admin: 10/31/20 09:13 Dose:  650 mg


   Documented by: 


Folic Acid (Folic Acid -)  1 mg PO DAILY [facility]


   Last Admin: 11/02/20 09:13 Dose:  1 mg


   Documented by: 


Heparin Sodium (Porcine) (Heparin -)  5,000 unit SQ BID [facility]


   Last Admin: 11/02/20 22:23 Dose:  5,000 unit


   Documented by: 


Aztreonam 1 gm/ Dextrose  50 mls @ 100 mls/hr IVPB Q8H-IV [facility]; Protocol


   Last Admin: 11/02/20 17:03 Dose:  100 mls/hr


   Documented by: 


Methadone HCl (Dolophine -)  5 mg PO ONCE@0600 ONE


   Stop: 11/03/20 06:01


Multivitamins/Minerals/Vitamin C (Tab-A-Vit -)  1 tab PO DAILY [facility]


   Last Admin: 11/02/20 09:13 Dose:  1 tab


   Documented by: 


Naloxone HCl (Narcan -)  0.4 mg IM PRN PRN


   PRN Reason: RESPIRATORY DEPRESSION


Thiamine HCl (Vitamin B1 -)  100 mg PO DAILY [facility]


   Last Admin: 11/02/20 09:13 Dose:  100 mg


   Documented by: 











- Objective


Vital Signs: 


                                   Vital Signs











Temperature  98.1 F   11/02/20 14:00


 


Pulse Rate  96 H  11/02/20 14:00


 


Respiratory Rate  20   11/02/20 14:00


 


Blood Pressure  117/75   11/02/20 14:00


 


O2 Sat by Pulse Oximetry (%)  97   11/02/20 14:00











Constitutional: Yes: Well Nourished, No Distress, Calm


Eyes: Yes: EOM Intact


HENT: Yes: Normocephalic


Neck: Yes: Supple


Cardiovascular: Yes: Regular Rate and Rhythm


Respiratory: Yes: CTA Bilaterally


Gastrointestinal: Yes: Soft


Extremities: Yes: Other


Wound/Incision: Yes: Clean/Dry


Labs: 


                                    CBC, BMP





                                 11/02/20 07:15 





                                 11/02/20 07:15 





                                    INR, PTT











INR  1.17  (0.83-1.09)  H  10/28/20  17:00    














Impression/Plan


Impression/Plan: 





43 year old transgender female with history of heroin use since the age of 21. 

Patient is a male by birth but identifies as a female and will be addressed as a

female as per her wishes.  She presents from [facility] with multiple abscess on 

bilateral lower ext.  





Left Foot abscess so I and D 10/29


f/u cultures final ID and sensitivitie


continue ABx per ID





 Opioid use disorder


on methadone taper


addiction medicine following





 Male-to-female transgender person


patient will follow up with [facility] for gender re assignment surgery next 

year


will resume her home meds for transgender patient. DW patient and nursing





elevated triglycerides 


diet control and repeat as an outpatient before starting therapy.





 DVT prophylaxis


SCDs


encouraged to get OOB to chair  





[id number] on not draining abscesses herself and risks of doing so. 





Visit type





- Emergency Visit


Emergency Visit: Yes


ED Registration Date: 10/28/20


Care time: The patient presented to the Emergency Department on the above date 

and was hospitalized for further evaluation of their emergent condition.





- New Patient


This patient is new to me today: Yes


Date on this admission: 11/03/20





- Critical Care


Critical Care patient: No

## 2020-11-02 NOTE — PN
Progress Note, Physician


History of Present Illness: 





stable


no new issues





- Current Medication List


Current Medications: 


Active Medications





Acetaminophen (Tylenol -)  650 mg PO Q6H PRN


   PRN Reason: Fever Or Pain 7-10


   Last Admin: 10/31/20 09:13 Dose:  650 mg


   Documented by: 


Folic Acid (Folic Acid -)  1 mg PO DAILY [facility]


   Last Admin: 11/01/20 09:29 Dose:  1 mg


   Documented by: 


Heparin Sodium (Porcine) (Heparin -)  5,000 unit SQ BID [facility]


   Last Admin: 11/01/20 23:23 Dose:  5,000 unit


   Documented by: 


Aztreonam 1 gm/ Dextrose  50 mls @ 100 mls/hr IVPB Q8H-IV [facility]; Protocol


   Last Admin: 11/02/20 02:09 Dose:  100 mls/hr


   Documented by: 


Methadone HCl (Dolophine -)  5 mg PO ONCE@0600 ONE


   Stop: 11/03/20 06:01


Methadone HCl (Dolophine -)  10 mg PO ONCE ONE


   Stop: 11/02/20 10:01


Multivitamins/Minerals/Vitamin C (Tab-A-Vit -)  1 tab PO DAILY [facility]


   Last Admin: 11/01/20 09:29 Dose:  1 tab


   Documented by: 


Naloxone HCl (Narcan -)  0.4 mg IM PRN PRN


   PRN Reason: RESPIRATORY DEPRESSION


Thiamine HCl (Vitamin B1 -)  100 mg PO DAILY [facility]


   Last Admin: 11/01/20 09:29 Dose:  100 mg


   Documented by: 











- Objective


Vital Signs: 


                                   Vital Signs











Temperature  97.9 F   11/02/20 06:00


 


Pulse Rate  60   11/02/20 06:00


 


Respiratory Rate  20   11/02/20 06:00


 


Blood Pressure  112/73   11/02/20 06:00


 


O2 Sat by Pulse Oximetry (%)  98   11/02/20 06:00











Constitutional: Yes: No Distress, Calm


Cardiovascular: Yes: S1, S2


Respiratory: Yes: Regular, CTA Bilaterally


Gastrointestinal: Yes: Normal Bowel Sounds, Soft


Musculoskeletal: Yes: WNL


Extremities: Yes: Other


Integumentary: Yes: Other


Wound/Incision: Yes: Dressing Dry and Intact


Neurological: Yes: Alert, Oriented


Psychiatric: Yes: Alert, Oriented


Labs: 


                                    CBC, BMP





                                 11/02/20 07:15 





                                 11/02/20 07:15 





                                    INR, PTT











INR  1.17  (0.83-1.09)  H  10/28/20  17:00    














Assessment/Plan





Problem List





- Problems


(1) Sepsis


Code(s): A41.9 - SEPSIS, UNSPECIFIED ORGANISM   





(2) Foot abscess


Code(s): L02.619 - CUTANEOUS ABSCESS OF UNSPECIFIED FOOT   





(3) Opioid use disorder


Code(s): F11.99 - OPIOID USE, UNSP WITH UNSPECIFIED OPIOID-INDUCED DISORDER   





(4) Male-to-female transgender person


Code(s): F64.0 - TRANSSEXUALISM  





leukocytosis





plan


continue abx


wound cx noted


will d/w the surgical team


wound care

## 2020-11-03 NOTE — PN
Progress Note, Physician


History of Present Illness: 





stable


no new issues





- Current Medication List


Current Medications: 


Active Medications





Acetaminophen (Tylenol -)  650 mg PO Q6H PRN


   PRN Reason: Fever Or Pain 7-10


   Last Admin: 10/31/20 09:13 Dose:  650 mg


   Documented by: 


Folic Acid (Folic Acid -)  1 mg PO DAILY [facility]


   Last Admin: 11/03/20 10:29 Dose:  1 mg


   Documented by: 


Heparin Sodium (Porcine) (Heparin -)  5,000 unit SQ BID [facility]


   Last Admin: 11/03/20 10:29 Dose:  5,000 unit


   Documented by: 


Aztreonam 1 gm/ Dextrose  50 mls @ 100 mls/hr IVPB Q8H-IV [facility]; Protocol


   Last Admin: 11/03/20 10:29 Dose:  100 mls/hr


   Documented by: 


Multivitamins/Minerals/Vitamin C (Tab-A-Vit -)  1 tab PO DAILY [facility]


   Last Admin: 11/03/20 10:29 Dose:  1 tab


   Documented by: 


Naloxone HCl (Narcan -)  0.4 mg IM PRN PRN


   PRN Reason: RESPIRATORY DEPRESSION


Thiamine HCl (Vitamin B1 -)  100 mg PO DAILY [facility]


   Last Admin: 11/03/20 10:30 Dose:  100 mg


   Documented by: 











- Objective


Vital Signs: 


                                   Vital Signs











Temperature  97.5 F L  11/03/20 05:57


 


Pulse Rate  53 L  11/03/20 05:57


 


Respiratory Rate  20   11/03/20 05:57


 


Blood Pressure  105/68   11/03/20 05:57


 


O2 Sat by Pulse Oximetry (%)  98   11/03/20 05:57











Constitutional: Yes: No Distress, Calm


Cardiovascular: Yes: Regular Rate and Rhythm


Respiratory: Yes: Regular, CTA Bilaterally


Gastrointestinal: Yes: Normal Bowel Sounds, Soft


Musculoskeletal: Yes: WNL


Extremities: Yes: WNL


Wound/Incision: Yes: Dressing Dry and Intact


Neurological: Yes: Alert, Oriented


Labs: 


                                    CBC, BMP





                                 11/03/20 06:30 





                                 11/03/20 06:30 





                                    INR, PTT











INR  1.17  (0.83-1.09)  H  10/28/20  17:00    














Assessment/Plan





Problem List





- Problems


(1) Sepsis


Code(s): A41.9 - SEPSIS, UNSPECIFIED ORGANISM   





(2) Foot abscess


Code(s): L02.619 - CUTANEOUS ABSCESS OF UNSPECIFIED FOOT   





(3) Opioid use disorder


Code(s): F11.99 - OPIOID USE, UNSP WITH UNSPECIFIED OPIOID-INDUCED DISORDER   





(4) Male-to-female transgender person


Code(s): F64.0 - TRANSSEXUALISM  





leukocytosis





plan


continue abx


wound cx noted


if surgery clears


patient can be switched to oral levaquin for 7 more days

## 2020-11-03 NOTE — PN
Progress Note, Physician


Chief Complaint: 





Seen and examined in bed. Dressing to left foot C/D/I. Remains on aztreonam. Can

change to PO abx tomorrow. Wound care being arranged on discharge


History of Present Illness: 





43 year old transgender female with history of heroin use since the age of 21. 

Patient is a male by birth but identifies as a female and will be addressed as a

female as per her wishes. Perferred name Geraldine She presents from [facility] 

with multiple abscess on bilateral lower ext.  








- Current Medication List


Current Medications: 


Active Medications





Acetaminophen (Tylenol -)  650 mg PO Q6H PRN


   PRN Reason: Fever Or Pain 7-10


   Last Admin: 10/31/20 09:13 Dose:  650 mg


   Documented by: 


Folic Acid (Folic Acid -)  1 mg PO DAILY [facility]


   Last Admin: 11/02/20 09:13 Dose:  1 mg


   Documented by: 


Heparin Sodium (Porcine) (Heparin -)  5,000 unit SQ BID [facility]


   Last Admin: 11/02/20 22:23 Dose:  5,000 unit


   Documented by: 


Aztreonam 1 gm/ Dextrose  50 mls @ 100 mls/hr IVPB Q8H-IV [facility]; Protocol


   Last Admin: 11/03/20 04:27 Dose:  100 mls/hr


   Documented by: 


Multivitamins/Minerals/Vitamin C (Tab-A-Vit -)  1 tab PO DAILY [facility]


   Last Admin: 11/02/20 09:13 Dose:  1 tab


   Documented by: 


Naloxone HCl (Narcan -)  0.4 mg IM PRN PRN


   PRN Reason: RESPIRATORY DEPRESSION


Thiamine HCl (Vitamin B1 -)  100 mg PO DAILY [facility]


   Last Admin: 11/02/20 09:13 Dose:  100 mg


   Documented by: 











- Objective


Vital Signs: 


                                   Vital Signs











Temperature  97.5 F L  11/03/20 05:57


 


Pulse Rate  53 L  11/03/20 05:57


 


Respiratory Rate  20   11/03/20 05:57


 


Blood Pressure  105/68   11/03/20 05:57


 


O2 Sat by Pulse Oximetry (%)  98   11/03/20 05:57











Constitutional: Yes: Well Nourished, No Distress, Calm


Eyes: Yes: WNL, Conjunctiva Clear


HENT: Yes: WNL, Atraumatic, Normocephalic


Neck: Yes: WNL, Supple, Trachea Midline


Cardiovascular: Yes: WNL, Regular Rate and Rhythm


Respiratory: Yes: WNL, Regular, CTA Bilaterally


Gastrointestinal: Yes: WNL, Normal Bowel Sounds


...Rectal Exam: Yes: Deferred


Genitourinary: Yes: WNL


Breast(s): Yes: WNL


Musculoskeletal: Yes: WNL


Extremities: Yes: WNL


Edema: No


Peripheral Pulses WNL: Yes


Peripheral Pulses: Left Radial: 2+, Right Radial: 2+, Left Doralis Pedis: 0 

(dressing), Right Dorsalis Pedis: 2+, Left Femoral: 2+, Right Femoral: 2+


Integumentary: Yes: Tattoos (multiple facial, extremeties, torso)


Wound/Incision: Yes: Clean/Dry, Dressing Dry and Intact


Neurological: Yes: WNL, Alert, Oriented


...Motor Strength: WNL


Psychiatric: Yes: WNL


Labs: 


                                    CBC, BMP





                                 11/03/20 06:30 





                                 11/03/20 06:30 





                                    INR, PTT











INR  1.17  (0.83-1.09)  H  10/28/20  17:00    














Problem List





- Problems


(1) Prophylactic measure


Assessment/Plan: 


FEN


Fluids: adequate PO intake


Electrolytes: monitor & replete as needed


Nutrition: reg diet





DVT


moderate risk


sq heparin





Dispo


Maintain as inpatient


full code


discharge planning to home with wound care


Code(s): Z29.9 - ENCOUNTER FOR PROPHYLACTIC MEASURES, UNSPECIFIED   





(2) Foot abscess


Assessment/Plan: 


f/u cultures final ID and sensitivitie


continue ABx per ID





Code(s): L02.619 - CUTANEOUS ABSCESS OF UNSPECIFIED FOOT   





(3) High triglycerides


Assessment/Plan: 


attempt to diet control


f/u as outpt


Code(s): E78.1 - PURE HYPERGLYCERIDEMIA   





(4) Male-to-female transgender person


Assessment/Plan: 


perfered name Geraldine, perferred pronoun she/her


patient will follow up with [place] for gender re assignment surgery next 

year


will resume her home meds for transgender patient. 


Code(s): F64.0 - TRANSSEXUALISM   





(5) Opioid use disorder


Assessment/Plan: 


completed methadone taper


from [facility]


does not wish to return for rehab


Code(s): F11.99 - OPIOID USE, UNSP WITH UNSPECIFIED OPIOID-INDUCED DISORDER   





(6) COVID-19 ruled out


Assessment/Plan: 


negative pcr


Code(s): Z03.818 - ENCNTR FOR OBS FOR SUSP EXPSR TO OTH BIOLG AGENTS RULED OUT  







Visit type





- Emergency Visit


Emergency Visit: Yes


ED Registration Date: 10/28/20


Care time: The patient presented to the Emergency Department on the above date 

and was hospitalized for further evaluation of their emergent condition.





- New Patient


This patient is new to me today: Yes


Date on this admission: 11/03/20





- Critical Care


Critical Care patient: No





- Discharge Referral


Referred to [facility] No

## 2020-11-04 NOTE — DS
Physical Exam: 


SUBJECTIVE: Patient seen and examined. Medically stable for discharge to [facility] with daily wound care and follow up in 1 week as oupatient








OBJECTIVE:





                                   Vital Signs











 Period  Temp  Pulse  Resp  BP Sys/Egan  Pulse Ox


 


 Last 24 Hr  97.9 F-98.2 F  66-75  16-18  /60-65  96-99








PHYSICAL EXAM





Constitutional: Yes: Well Nourished, No Distress, Calm


Eyes: Yes: WNL, Conjunctiva Clear


HENT: Yes: WNL, Atraumatic, Normocephalic


Neck: Yes: WNL, Supple, Trachea Midline


Cardiovascular: Yes: WNL, Regular Rate and Rhythm


Respiratory: Yes: WNL, Regular, CTA Bilaterally


Gastrointestinal: Yes: WNL, Normal Bowel Sounds


...Rectal Exam: Yes: Deferred


Genitourinary: Yes: WNL


Breast(s): Yes: WNL


Musculoskeletal: Yes: WNL


Extremities: Yes: WNL


Edema: No


Peripheral Pulses WNL: Yes


Peripheral Pulses: Left Radial: 2+, Right Radial: 2+, Left Doralis Pedis: 0 

(dressing), Right Dorsalis Pedis: 2+, Left Femoral: 2+, Right Femoral: 2+


Integumentary: Yes: Tattoos (multiple facial, extremeties, torso)


Wound/Incision: Yes: Clean/Dry, Dressing Dry and Intact


Neurological: Yes: WNL, Alert, Oriented


...Motor Strength: WNL


Psychiatric: Yes: WNL





LABS


                         Laboratory Results - last 24 hr











  11/03/20





  06:30


 


Creatinine  0.7


 


Est GFR (CKD-EPI)AfAm  133.96


 


Est GFR (CKD-EPI)NonAf  115.58


 


Total Bilirubin  0.2


 


AST  41 H


 


ALT  51


 


Alkaline Phosphatase  53


 


Total Protein  7.2


 


Albumin  2.6 L











HOSPITAL COURSE:





Date of Admission:10/28/20





Date of Discharge: 11/04/20








Problem List





- Problems


(1) Prophylactic measure


Assessment/Plan: 


FEN


Fluids: adequate PO intake


Electrolytes: monitored & repleted as needed


Nutrition: reg diet








Dispo


discharge to [facility] in [facility] with wound care


Code(s): Z29.9 - ENCOUNTER FOR PROPHYLACTIC MEASURES, UNSPECIFIED   





(2) Foot abscess


Assessment/Plan: 


continue ABx x 7 days-[facility]


stressed improtance to pt to keep foot clean and dry with daily dressing 

changes. Off load weight with crutches and wear surgical shoes. Follow up with EBONI Nur in 7 days for post-op visit. Pt can arrange medicaid ride to bring to 

appt





Code(s): L02.619 - CUTANEOUS ABSCESS OF UNSPECIFIED FOOT   





(3) High triglycerides


Assessment/Plan: 


attempt to diet control


f/u as outpt


Code(s): E78.1 - PURE HYPERGLYCERIDEMIA   





(4) Male-to-female transgender person


Assessment/Plan: 


perfered name Geraldine, perferred pronoun she/her


patient will follow up with [place] for gender re assignment surgery next 

year


will resume her home meds for transgender patient. 


Code(s): F64.0 - TRANSSEXUALISM   





(5) Opioid use disorder


Assessment/Plan: 


completed methadone taper


from [facility]


does not wish to return for rehab


Code(s): F11.99 - OPIOID USE, UNSP WITH UNSPECIFIED OPIOID-INDUCED DISORDER   





(6) COVID-19 ruled out


Assessment/Plan: 


negative pcr


Code(s): Z03.818 - ENCNTR FOR OBS FOR SUSP EXPSR TO OTH BIOLG AGENTS RULED OUT  













Minutes to complete discharge: 55





Discharge Summary


Problems reviewed: Yes


Reason For Visit: ABSCESS OF FOOT CELLULITIS


Current Active Problems





COVID-19 ruled out (Acute)


Cellulitis (Acute)


DVT prophylaxis (Acute)


Foot abscess (Acute)


High triglycerides (Acute)


Male-to-female transgender person (Acute)


Opioid use disorder (Acute)


Prophylactic measure (Acute)


Sepsis (Acute)








Condition: Improved





- Instructions


Diet, Activity, Other Instructions: 


DISCHARGE 





YOUR VISIT


You came to the hospital because developed an abcess in your foot. It was 

surgically opened and cleaned. Cover the wound with a dry sterile bandage and 

keep dry and clean.  You were also detoxed using methadone. 





MEDICATIONS


Please continue to take your home medications as prescribed. There were changes


LEVAQUIN 750 mg daily x 1 week





DIET


Continue your home diet





ADDITIONAL CARE


Please make an appointment to see your primary care provider, 2 week from today.




Surgery:





left foot wound care: Shower daily and cover with dry sterile dressing





Follow up with Dr Nur 1 one week after discharge for wound care





ADDITIONAL INFORMATION


Please call 911 or come directly to the emergency department if you experience 

unusual headache, vision change, shortness of breath, chest pain, numbness, 

tingling, loss of alertness/awareness, loss of function, unusual bleeding or any

alarming symptoms.





Thank you for allowing me to care for you.





Matt Valenzuela, [facility], [facility]


[phone]


Referrals: 


Raymond Nur MD [Staff Physician] - 


Disposition: HOME





- Home Medications


Comprehensive Discharge Medication List: 


Ambulatory Orders





Emtricitabine/Tenofovir [Truvada -] 1 tablet PO DAILY 10/23/19 


Estradiol Valerate [Delestrogen] 20 mg IM WEEKLY 10/23/19 


Spironolactone 100 mg PO BID 10/23/19 


Divalproex [Depakote -] 500 mg PO BID 10/28/20 








Prescription Drug Monitoring Program (I-STOP) results: I-STOP reviewed and no 

issues identified





Problem List





- Problems


(1) Prophylactic measure


Code(s): Z29.9 - ENCOUNTER FOR PROPHYLACTIC MEASURES, UNSPECIFIED   





(2) Foot abscess


Code(s): L02.619 - CUTANEOUS ABSCESS OF UNSPECIFIED FOOT   





(3) High triglycerides


Code(s): E78.1 - PURE HYPERGLYCERIDEMIA   





(4) Male-to-female transgender person


Code(s): F64.0 - TRANSSEXUALISM   





(5) Opioid use disorder


Code(s): F11.99 - OPIOID USE, UNSP WITH UNSPECIFIED OPIOID-INDUCED DISORDER   





(6) COVID-19 ruled out


Code(s): Z03.818 - ENCNTR FOR OBS FOR SUSP EXPSR TO OTH BIOLG AGENTS RULED OUT  




This patient is new to me today: No


Emergency Visit: Yes


ED Registration Date: 10/28/20


Care time: The patient presented to the Emergency Department on the above date 

and was hospitalized for further evaluation of their emergent condition.


Critical Care patient: No





- Discharge Referral


Referred to [facility] No

## 2020-11-04 NOTE — PN
Progress Note, Physician


History of Present Illness: 





stable


no new issues





- Current Medication List


Current Medications: 


Active Medications





Acetaminophen (Tylenol -)  650 mg PO Q6H PRN


   PRN Reason: Fever Or Pain 7-10


   Last Admin: 10/31/20 09:13 Dose:  650 mg


   Documented by: 


Folic Acid (Folic Acid -)  1 mg PO DAILY [facility]


   Last Admin: 11/04/20 10:25 Dose:  1 mg


   Documented by: 


Heparin Sodium (Porcine) (Heparin -)  5,000 unit SQ BID [facility]


   Last Admin: 11/04/20 10:26 Dose:  5,000 unit


   Documented by: 


Levofloxacin (Levaquin -)  750 mg PO DAILY [facility]


   Stop: 11/10/20 10:01


   Last Admin: 11/04/20 10:26 Dose:  750 mg


   Documented by: 


Multivitamins/Minerals/Vitamin C (Tab-A-Vit -)  1 tab PO DAILY [facility]


   Last Admin: 11/04/20 10:25 Dose:  1 tab


   Documented by: 


Naloxone HCl (Narcan -)  0.4 mg IM PRN PRN


   PRN Reason: RESPIRATORY DEPRESSION


Thiamine HCl (Vitamin B1 -)  100 mg PO DAILY [facility]


   Last Admin: 11/04/20 10:25 Dose:  100 mg


   Documented by: 











- Objective


Vital Signs: 


                                   Vital Signs











Temperature  97.9 F   11/04/20 06:00


 


Pulse Rate  75   11/04/20 06:00


 


Respiratory Rate  18   11/04/20 06:00


 


Blood Pressure  95/63   11/04/20 06:00


 


O2 Sat by Pulse Oximetry (%)  98   11/04/20 06:00











Constitutional: Yes: No Distress, Calm


Cardiovascular: Yes: S1, S2


Respiratory: Yes: Regular, CTA Bilaterally


Gastrointestinal: Yes: Normal Bowel Sounds, Soft


Musculoskeletal: Yes: WNL


Extremities: Yes: Other


Wound/Incision: Yes: Dressing Dry and Intact


Neurological: Yes: Alert, Oriented


Psychiatric: Yes: Alert, Oriented


Labs: 


                                    CBC, BMP





                                 11/04/20 08:45 





                                 11/04/20 08:45 





                                    INR, PTT











INR  1.17  (0.83-1.09)  H  10/28/20  17:00    














Assessment/Plan





Problem List





- Problems


(1) Sepsis


Code(s): A41.9 - SEPSIS, UNSPECIFIED ORGANISM   





(2) Foot abscess


Code(s): L02.619 - CUTANEOUS ABSCESS OF UNSPECIFIED FOOT   





(3) Opioid use disorder


Code(s): F11.99 - OPIOID USE, UNSP WITH UNSPECIFIED OPIOID-INDUCED DISORDER   





(4) Male-to-female transgender person


Code(s): F64.0 - TRANSSEXUALISM  





leukocytosis





plan


continue abx


wound cx noted


if surgery clears


patient can be switched to oral levaquin for 7 more days

## 2020-11-04 NOTE — PN
Progress Note, Physician





- Current Medication List


Current Medications: 


Active Medications





Acetaminophen (Tylenol -)  650 mg PO Q6H PRN


   PRN Reason: Fever Or Pain 7-10


   Last Admin: 10/31/20 09:13 Dose:  650 mg


   Documented by: 


Folic Acid (Folic Acid -)  1 mg PO DAILY [facility]


   Last Admin: 11/04/20 10:25 Dose:  1 mg


   Documented by: 


Heparin Sodium (Porcine) (Heparin -)  5,000 unit SQ BID [facility]


   Last Admin: 11/04/20 10:26 Dose:  5,000 unit


   Documented by: 


Levofloxacin (Levaquin -)  750 mg PO DAILY [facility]


   Stop: 11/10/20 10:01


   Last Admin: 11/04/20 10:26 Dose:  750 mg


   Documented by: 


Multivitamins/Minerals/Vitamin C (Tab-A-Vit -)  1 tab PO DAILY [facility]


   Last Admin: 11/04/20 10:25 Dose:  1 tab


   Documented by: 


Naloxone HCl (Narcan -)  0.4 mg IM PRN PRN


   PRN Reason: RESPIRATORY DEPRESSION


Thiamine HCl (Vitamin B1 -)  100 mg PO DAILY [facility]


   Last Admin: 11/04/20 10:25 Dose:  100 mg


   Documented by: 











- Objective


Vital Signs: 


                                   Vital Signs











Temperature  97.9 F   11/04/20 06:00


 


Pulse Rate  75   11/04/20 06:00


 


Respiratory Rate  18   11/04/20 06:00


 


Blood Pressure  95/63   11/04/20 06:00


 


O2 Sat by Pulse Oximetry (%)  98   11/04/20 06:00











Labs: 


                                    CBC, BMP





                                 11/04/20 08:45 





                                 11/04/20 08:45 





                                    INR, PTT











INR  1.17  (0.83-1.09)  H  10/28/20  17:00